# Patient Record
Sex: FEMALE | Race: WHITE
[De-identification: names, ages, dates, MRNs, and addresses within clinical notes are randomized per-mention and may not be internally consistent; named-entity substitution may affect disease eponyms.]

---

## 2017-12-25 ENCOUNTER — HOSPITAL ENCOUNTER (EMERGENCY)
Dept: HOSPITAL 41 - JD.ED | Age: 82
Discharge: HOME | End: 2017-12-25
Payer: MEDICARE

## 2017-12-25 VITALS — DIASTOLIC BLOOD PRESSURE: 81 MMHG | SYSTOLIC BLOOD PRESSURE: 129 MMHG

## 2017-12-25 DIAGNOSIS — Z79.82: ICD-10-CM

## 2017-12-25 DIAGNOSIS — Z79.01: ICD-10-CM

## 2017-12-25 DIAGNOSIS — Z88.2: ICD-10-CM

## 2017-12-25 DIAGNOSIS — Z87.891: ICD-10-CM

## 2017-12-25 DIAGNOSIS — I48.91: Primary | ICD-10-CM

## 2017-12-25 DIAGNOSIS — Z88.5: ICD-10-CM

## 2017-12-25 DIAGNOSIS — Z79.899: ICD-10-CM

## 2017-12-25 PROCEDURE — 36415 COLL VENOUS BLD VENIPUNCTURE: CPT

## 2017-12-25 PROCEDURE — 96365 THER/PROPH/DIAG IV INF INIT: CPT

## 2017-12-25 PROCEDURE — 85025 COMPLETE CBC W/AUTO DIFF WBC: CPT

## 2017-12-25 PROCEDURE — 93005 ELECTROCARDIOGRAM TRACING: CPT

## 2017-12-25 PROCEDURE — 96366 THER/PROPH/DIAG IV INF ADDON: CPT

## 2017-12-25 PROCEDURE — 80053 COMPREHEN METABOLIC PANEL: CPT

## 2017-12-25 PROCEDURE — 84443 ASSAY THYROID STIM HORMONE: CPT

## 2017-12-25 PROCEDURE — 84484 ASSAY OF TROPONIN QUANT: CPT

## 2017-12-25 PROCEDURE — 99285 EMERGENCY DEPT VISIT HI MDM: CPT

## 2017-12-25 PROCEDURE — 71010: CPT

## 2017-12-25 PROCEDURE — 96376 TX/PRO/DX INJ SAME DRUG ADON: CPT

## 2017-12-26 NOTE — CR
Chest: Portable view of the chest was obtained.

 

Comparison: Prior chest CT of 05/23/16 is available and chest x-ray of

 10/31/15 is available

 

Calcified breast prosthesis are identified bilaterally which are 

stable.  Heart size and mediastinum are within normal limits for 

portable technique.  Pulmonary vessels are slightly congested.  Lungs 

otherwise are clear.  Bony structures are grossly intact.  Previous 

sternotomy is noted.  Surgical clips are seen from prior 

cholecystectomy.

 

Impression:

1.  Possible mild pulmonary vascular congestion.

2.  Other incidental findings.

 

Diagnostic code #3

## 2018-01-04 ENCOUNTER — HOSPITAL ENCOUNTER (INPATIENT)
Dept: HOSPITAL 41 - JD.ED | Age: 83
LOS: 5 days | Discharge: NURSING, REHAB OR LONG-TERM CARE | DRG: 183 | End: 2018-01-09
Attending: INTERNAL MEDICINE | Admitting: INTERNAL MEDICINE
Payer: MEDICARE
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
## 2018-01-04 DIAGNOSIS — I44.0: ICD-10-CM

## 2018-01-04 DIAGNOSIS — S22.42XA: Primary | ICD-10-CM

## 2018-01-04 DIAGNOSIS — R10.9: ICD-10-CM

## 2018-01-04 DIAGNOSIS — I25.10: ICD-10-CM
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
## 2018-01-04 DIAGNOSIS — H35.30: ICD-10-CM

## 2018-01-04 DIAGNOSIS — M19.90: ICD-10-CM

## 2018-01-04 DIAGNOSIS — Z88.5: ICD-10-CM

## 2018-01-04 DIAGNOSIS — R07.81: ICD-10-CM

## 2018-01-04 DIAGNOSIS — I10: ICD-10-CM

## 2018-01-04 DIAGNOSIS — R53.1: ICD-10-CM

## 2018-01-04 DIAGNOSIS — I48.91: ICD-10-CM

## 2018-01-04 DIAGNOSIS — E87.5: ICD-10-CM

## 2018-01-04 DIAGNOSIS — S27.2XXA: ICD-10-CM

## 2018-01-04 DIAGNOSIS — Z90.49: ICD-10-CM

## 2018-01-04 DIAGNOSIS — Z88.6: ICD-10-CM

## 2018-01-04 DIAGNOSIS — Z79.899: ICD-10-CM

## 2018-01-04 DIAGNOSIS — M79.7: ICD-10-CM

## 2018-01-04 DIAGNOSIS — Z87.891: ICD-10-CM

## 2018-01-04 DIAGNOSIS — Z90.710: ICD-10-CM

## 2018-01-04 DIAGNOSIS — M54.9: ICD-10-CM

## 2018-01-04 DIAGNOSIS — R79.1: ICD-10-CM

## 2018-01-04 DIAGNOSIS — F41.9: ICD-10-CM

## 2018-01-04 DIAGNOSIS — R32: ICD-10-CM

## 2018-01-04 DIAGNOSIS — Z95.5: ICD-10-CM

## 2018-01-04 DIAGNOSIS — G89.11: ICD-10-CM

## 2018-01-04 DIAGNOSIS — I25.2: ICD-10-CM

## 2018-01-04 DIAGNOSIS — E03.9: ICD-10-CM

## 2018-01-04 DIAGNOSIS — I71.4: ICD-10-CM

## 2018-01-04 DIAGNOSIS — Z79.02: ICD-10-CM

## 2018-01-04 DIAGNOSIS — Z79.01: ICD-10-CM

## 2018-01-04 DIAGNOSIS — I95.9: ICD-10-CM

## 2018-01-04 DIAGNOSIS — Z88.2: ICD-10-CM

## 2018-01-04 PROCEDURE — 84484 ASSAY OF TROPONIN QUANT: CPT

## 2018-01-04 PROCEDURE — 96376 TX/PRO/DX INJ SAME DRUG ADON: CPT

## 2018-01-04 PROCEDURE — 85730 THROMBOPLASTIN TIME PARTIAL: CPT

## 2018-01-04 PROCEDURE — 81001 URINALYSIS AUTO W/SCOPE: CPT

## 2018-01-04 PROCEDURE — 71045 X-RAY EXAM CHEST 1 VIEW: CPT

## 2018-01-04 PROCEDURE — 74177 CT ABD & PELVIS W/CONTRAST: CPT

## 2018-01-04 PROCEDURE — 96361 HYDRATE IV INFUSION ADD-ON: CPT

## 2018-01-04 PROCEDURE — 80053 COMPREHEN METABOLIC PANEL: CPT

## 2018-01-04 PROCEDURE — G0378 HOSPITAL OBSERVATION PER HR: HCPCS

## 2018-01-04 PROCEDURE — 85610 PROTHROMBIN TIME: CPT

## 2018-01-04 PROCEDURE — 99285 EMERGENCY DEPT VISIT HI MDM: CPT

## 2018-01-04 PROCEDURE — 71260 CT THORAX DX C+: CPT

## 2018-01-04 PROCEDURE — 97530 THERAPEUTIC ACTIVITIES: CPT

## 2018-01-04 PROCEDURE — 36415 COLL VENOUS BLD VENIPUNCTURE: CPT

## 2018-01-04 PROCEDURE — 82248 BILIRUBIN DIRECT: CPT

## 2018-01-04 PROCEDURE — 97166 OT EVAL MOD COMPLEX 45 MIN: CPT

## 2018-01-04 PROCEDURE — 94667 MNPJ CHEST WALL 1ST: CPT

## 2018-01-04 PROCEDURE — 85025 COMPLETE CBC W/AUTO DIFF WBC: CPT

## 2018-01-04 PROCEDURE — 96375 TX/PRO/DX INJ NEW DRUG ADDON: CPT

## 2018-01-04 PROCEDURE — C1729 CATH, DRAINAGE: HCPCS

## 2018-01-04 PROCEDURE — 96374 THER/PROPH/DIAG INJ IV PUSH: CPT

## 2018-01-04 RX ADMIN — HYDROCODONE BITARTRATE AND ACETAMINOPHEN PRN TAB: 5; 325 TABLET ORAL at 21:25

## 2018-01-04 RX ADMIN — Medication PRN ML: at 10:59

## 2018-01-04 RX ADMIN — SODIUM CHLORIDE PRN MG: 9 INJECTION, SOLUTION INTRAVENOUS at 18:38

## 2018-01-04 RX ADMIN — KETOROLAC TROMETHAMINE SCH MG: 15 INJECTION, SOLUTION INTRAMUSCULAR; INTRAVENOUS at 18:13

## 2018-01-04 RX ADMIN — Medication PRN ML: at 11:23

## 2018-01-04 RX ADMIN — HYDROCODONE BITARTRATE AND ACETAMINOPHEN PRN TAB: 5; 325 TABLET ORAL at 17:17

## 2018-01-04 RX ADMIN — Medication SCH TAB: at 21:25

## 2018-01-04 NOTE — EDM.PDOC
ED HPI GENERAL MEDICAL PROBLEM





- General


Chief Complaint: Back Pain or Injury


Stated Complaint: FALL-BACK PAIN


Time Seen by Provider: 01/04/18 10:10





- History of Present Illness


INITIAL COMMENTS - FREE TEXT/NARRATIVE: 





82-year-old female presents emergency room with left-sided back and rib pain.





Shortly before arrival the patient fell striking a piece of furniture with her 

left back. She's developed significant pain aggravated by breathing and change 

of position she's developed significant left-sided abdominal pain. The patient 

currently is on Plavix and Coumadin she recently had a couple cardiac stents 

placed and has a history of atrial fibrillation. Patient has not developed any 

nausea or vomiting no fevers or chills no cough.


  ** Middle Back


Pain Score (Numeric/FACES): 10





- Related Data


 Allergies











Allergy/AdvReac Type Severity Reaction Status Date / Time


 


codeine Allergy  Disorientat Verified 01/04/18 09:50





   ion  


 


Sulfa (Sulfonamide Allergy  Cannot Verified 01/04/18 09:50





Antibiotics)   Remember  











Home Meds: 


 Home Meds





Alpha Lipoic Acid [Alpha-Lipoic Acid] 200 mg PO DAILY 06/08/15 [History]


Amitriptyline [Elavil] 25 mg PO BEDTIME 06/08/15 [History]


Cinnamon Bark [Cinnamon] 500 mg PO DAILY 06/08/15 [History]


Fish Oil/Omega-3 Fatty Acids [Fish Oil] 1,200 mg PO DAILY 06/08/15 [History]


Metoprolol Succinate [Toprol XL] 100 mg PO DAILY 06/08/15 [History]


Ubidecarenone [Co Q-10] 100 mg PO DAILY 06/08/15 [History]


Clopidogrel [Plavix] 75 mg PO DAILY 12/25/17 [History]


Levothyroxine [Synthroid] 88 mcg PO DAILY 12/25/17 [History]


Magnesium 250 mg PO DAILY 12/25/17 [History]


Multivit-Min/FA/Lycopene/Lut [Centrum Silver Tablet] 1 tab PO DAILY 12/25/17 [

History]


GrowBLOX  1 tab PO DAILY 12/25/17 [History]


Vit A/Vit C/Vit E/Zinc/Copper [Icaps Areds Formula] 2 tab PO BID 12/25/17 [

History]


Warfarin [Coumadin] 5 mg PO SUTUTHSA 12/25/17 [History]


Warfarin [Coumadin] 7.5 mg PO MOWEFR 12/25/17 [History]


atorvaSTATin [Lipitor] 10 mg PO DAILY 12/25/17 [History]











Past Medical History


HEENT History: Reports: Cataract, Macular Degeneration


Cardiovascular History: Reports: Stents


Other Cardiovascular History: resolved after surgery


Other Gastrointestinal History: during surgery intestine punctured


Genitourinary History: Reports: Urinary Incontinence


OB/GYN History: Reports: Pregnancy


Other OB/BYN History: x3


Musculoskeletal History: Reports: Arthritis, Fibromyalgia


Psychiatric History: Reports: Anxiety


Endocrine/Metabolic History: Reports: Hypothyroidism





- Past Surgical History


HEENT Surgical History: Reports: Cataract Surgery


Other HEENT Surgeries/Procedures: bi-lat


Other Cardiovascular Surgeries/Procedures: aortic anuerysm


GI Surgical History: Reports: Cholecystectomy


Female  Surgical History: Reports: Hysterectomy





Social & Family History





- Tobacco Use


Smoking Status *Q: Never Smoker


Years of Tobacco use: 15


Used Tobacco, but Quit: Yes


Month Tobacco Last Used: 15 yrs ago


Second Hand Smoke Exposure: No





- Caffeine Use


Caffeine Use: Reports: None





- Alcohol Use


Days Per Week of Alcohol Use: 0





- Recreational Drug Use


Recreational Drug Use: No





Review of Systems





- Review of Systems


Review Of Systems: See Below


Constitutional: Reports: No Symptoms


Eyes: Reports: No Symptoms


Ears: Reports: No Symptoms


Nose: Reports: No Symptoms


Mouth/Throat: Reports: No Symptoms


Respiratory: Reports: Pleuritic Chest Pain, Cough (Occasional).  Denies: 

Wheezing, Sputum


Cardiovascular: Reports: Chest Pain, Irregular Heart Rate.  Denies: Edema


GI/Abdominal: Reports: Abdominal Pain.  Denies: Bloody Stool, Constipation, 

Decreased Appetite, Diarrhea, Hematemesis, Nausea, Vomiting


Genitourinary: Reports: No Symptoms


Musculoskeletal: Reports: Back Pain


Skin: Reports: No Symptoms


Neurological: Reports: No Symptoms


Psychiatric: Reports: No Symptoms





ED EXAM, GENERAL





- Physical Exam


Exam: See Below


Exam Limited By: No Limitations


General Appearance: Alert, Mild Distress (From the discomfort usually triggered 

by change of position)


Head: Atraumatic, Normocephalic


Neck: Normal Inspection, Supple, Non-Tender, Full Range of Motion.  No: 

Lymphadenopathy (L), Lymphadenopathy (R)


Respiratory/Chest: No Respiratory Distress, Lungs Clear, Normal Breath Sounds


Cardiovascular: Regular Rate, Rhythm, No Edema, No Murmur, Other (On telemetry 

she has some sinus irregularity but is in a sinus driven rhythm)


GI/Abdominal: Other (Bowel sounds slightly diminished she has pretty 

significant left-sided abdominal discomfort no bruising or signs of external 

trauma. No apparent rebound or guarding)


Back Exam: Normal Inspection.  No: CVA Tenderness (L), CVA Tenderness (R), 

Vertebral Tenderness


Psychiatric: Normal Affect, Normal Mood


Skin Exam: Warm, Dry, Intact


Lymphatic: No Adenopathy





EKG INTERPRETATION


EKG Date: 01/04/18


Rhythm: Other (Sinus driven with sinus irregularity)


Axis: Normal


P-Wave: Absent (First-degree AV block)


QRS: Normal


ST-T: Other (nonspecific nondiagnostic changes)


QT: Normal


Comparison: NA - No Prior EKG (Other than rhythm abnormalities prior atrial 

fibrillation no significant changes noted)


EKG Interpretation Comments: 





Abnormal first-degree AV block sinus driven dysrhythmia





Course





- Vital Signs


Last Recorded V/S: 


 Last Vital Signs











Temp  36.3 C   01/04/18 09:42


 


Pulse  63   01/04/18 09:42


 


Resp  16   01/04/18 09:42


 


BP  142/86 H  01/04/18 09:42


 


Pulse Ox  96   01/04/18 09:42














- Orders/Labs/Meds


Orders: 


 Active Orders 24 hr











 Category Date Time Status


 


 URINALYSIS W/MICROSCOPIC [UA W/MICROSCOPIC] [URIN] Stat Lab  01/04/18 11:44 

Uncollected


 


 Sodium Chloride 0.9% [Saline Flush] Med  01/04/18 10:43 Active





 10 ml FLUSH ONETIME PRN   








 Medication Orders





Sodium Chloride (Saline Flush)  10 ml FLUSH ONETIME PRN


   PRN Reason: IV FLUSH


   Last Admin: 01/04/18 11:23  Dose: 10 ml


   Admin: 01/04/18 10:59  Dose: 10 ml








Labs: 


 Laboratory Tests











  01/04/18 01/04/18 01/04/18 Range/Units





  09:00 09:50 09:50 


 


WBC   8.30   (3.98-10.04)  K/mm3


 


RBC   4.92   (3.98-5.22)  M/mm3


 


Hgb   14.9   (11.2-15.7)  gm/L


 


Hct   44.6   (34.1-44.9)  %


 


MCV   90.7   (79.4-94.8)  fl


 


MCH   30.3   (25.6-32.2)  pg


 


MCHC   33.4   (32.2-35.5)  g/dl


 


RDW Std Deviation   44.2   (36.4-46.3)  fL


 


Plt Count   239   (182-369)  K/mm3


 


MPV   9.7   (9.4-12.3)  fl


 


Neutrophils % (Manual)   50   (40-60)  %


 


Band Neutrophils %   0   (0-10)  %


 


Lymphocytes % (Manual)   41 H   (20-40)  %


 


Atypical Lymphs %   0   %


 


Monocytes % (Manual)   7   (2-10)  %


 


Eosinophils % (Manual)   1   (0.7-5.8)  %


 


Basophils % (Manual)   1   (0.1-1.2)  


 


Platelet Estimate   Adequate   


 


RBC Morph Comment   Normal   


 


PT    20.6 H  (8.0-13.0)  SECONDS


 


INR    1.82  


 


APTT    34  (22-36)  SECONDS


 


Sodium     (136-145)  mEq/L


 


Potassium     (3.5-5.1)  mEq/L


 


Chloride     ()  mEq/L


 


Carbon Dioxide     (21-32)  mEq/L


 


Anion Gap     (5-15)  


 


BUN     (7-18)  mg/dL


 


Creatinine     (0.55-1.02)  mg/dL


 


Est Cr Clr Drug Dosing     mL/min


 


Estimated GFR (MDRD)     (>60)  mL/min


 


BUN/Creatinine Ratio     (14-18)  


 


Glucose     ()  mg/dL


 


Calcium     (8.5-10.1)  mg/dL


 


Total Bilirubin     (0.2-1.0)  mg/dL


 


Direct Bilirubin  0.30 H    (0.0-0.2)  mg/dl


 


AST     (15-37)  U/L


 


ALT     (14-59)  U/L


 


Alkaline Phosphatase     ()  U/L


 


Troponin I     (0.00-0.056)  ng/mL


 


Total Protein     (6.4-8.2)  g/dl


 


Albumin     (3.4-5.0)  g/dl


 


Globulin     gm/dL


 


Albumin/Globulin Ratio     (1-2)  














  01/04/18 Range/Units





  09:50 


 


WBC   (3.98-10.04)  K/mm3


 


RBC   (3.98-5.22)  M/mm3


 


Hgb   (11.2-15.7)  gm/L


 


Hct   (34.1-44.9)  %


 


MCV   (79.4-94.8)  fl


 


MCH   (25.6-32.2)  pg


 


MCHC   (32.2-35.5)  g/dl


 


RDW Std Deviation   (36.4-46.3)  fL


 


Plt Count   (182-369)  K/mm3


 


MPV   (9.4-12.3)  fl


 


Neutrophils % (Manual)   (40-60)  %


 


Band Neutrophils %   (0-10)  %


 


Lymphocytes % (Manual)   (20-40)  %


 


Atypical Lymphs %   %


 


Monocytes % (Manual)   (2-10)  %


 


Eosinophils % (Manual)   (0.7-5.8)  %


 


Basophils % (Manual)   (0.1-1.2)  


 


Platelet Estimate   


 


RBC Morph Comment   


 


PT   (8.0-13.0)  SECONDS


 


INR   


 


APTT   (22-36)  SECONDS


 


Sodium  144  (136-145)  mEq/L


 


Potassium  4.0  (3.5-5.1)  mEq/L


 


Chloride  109 H  ()  mEq/L


 


Carbon Dioxide  27  (21-32)  mEq/L


 


Anion Gap  12.0  (5-15)  


 


BUN  20 H  (7-18)  mg/dL


 


Creatinine  1.0  (0.55-1.02)  mg/dL


 


Est Cr Clr Drug Dosing  40.60  mL/min


 


Estimated GFR (MDRD)  53  (>60)  mL/min


 


BUN/Creatinine Ratio  20.0 H  (14-18)  


 


Glucose  98  ()  mg/dL


 


Calcium  8.9  (8.5-10.1)  mg/dL


 


Total Bilirubin  2.1 H  (0.2-1.0)  mg/dL


 


Direct Bilirubin   (0.0-0.2)  mg/dl


 


AST  31  (15-37)  U/L


 


ALT  29  (14-59)  U/L


 


Alkaline Phosphatase  74  ()  U/L


 


Troponin I  < 0.017  (0.00-0.056)  ng/mL


 


Total Protein  7.4  (6.4-8.2)  g/dl


 


Albumin  3.7  (3.4-5.0)  g/dl


 


Globulin  3.7  gm/dL


 


Albumin/Globulin Ratio  1.0  (1-2)  











Meds: 


Medications











Generic Name Dose Route Start Last Admin





  Trade Name Freq  PRN Reason Stop Dose Admin


 


Sodium Chloride  10 ml  01/04/18 10:43  01/04/18 11:23





  Saline Flush  FLUSH   10 ml





  ONETIME PRN   Administration





  IV FLUSH   














Discontinued Medications














Generic Name Dose Route Start Last Admin





  Trade Name Freq  PRN Reason Stop Dose Admin


 


Hydromorphone HCl  1 mg  01/04/18 11:06  01/04/18 11:10





  Dilaudid  IVPUSH  01/04/18 11:07  1 mg





  ONETIME ONE   Administration


 


Hydromorphone HCl  Confirm  01/04/18 11:12  01/04/18 11:16





  Dilaudid  Administered  01/04/18 11:13  Not Given





  Dose   





  1 mg   





  .ROUTE   





  .STK-MED ONE   


 


Sodium Chloride  500 mls @ 250 mls/hr  01/04/18 10:36  01/04/18 10:42





  Normal Saline  IV  01/04/18 12:35  250 mls/hr





  .BOLUS ONE   Administration


 


Sodium Chloride  Confirm  01/04/18 10:44  01/04/18 10:43





  Normal Saline  Administered  01/04/18 10:45  Not Given





  Dose   





  1,000 mls @ as directed   





  .ROUTE   





  .STK-MED ONE   


 


Iopamidol  100 ml  01/04/18 10:43  01/04/18 11:23





  Isovue-300 (61%)  IVPUSH  01/04/18 10:44  100 ml





  ONETIME ONE   Administration


 


Morphine Sulfate  2 mg  01/04/18 10:35  01/04/18 10:40





  Morphine  IVPUSH  01/04/18 10:36  2 mg





  ONETIME ONE   Administration














- Re-Assessments/Exams


Free Text/Narrative Re-Assessment/Exam: 





01/04/18 12:38


Laboratory evaluation is nondiagnostic of concern is mildly elevated bilirubin 

at 2.1 she has no associated bandemia INR is slightly subtherapeutic. Because 

the patient's symptoms x-ray examination was deferred the patient was sent to 

CT which is concerning for rib fractures on the left 7 through 10 and possibly 

to the patient does not have tenderness over the upper chest. At her age and 

with at least 4 rib fractures the patient should probably be watched closely to 

be certain she is maintaining her O2 saturation and has adequate pain control. 

She will need supervision with incentive spirometry and deep breathing 

exercises.


01/04/18 12:43


We believe we will have a bed available for the patient in another hour or so. 

Case discussed with Dr. Palacios or hospitalist.





Departure





- Departure


Time of Disposition: 12:44


Disposition: Refer to Observation


Clinical Impression: 


 Multiple fractures of ribs, left side, initial encounter for closed fracture








- Discharge Information


Referrals: 


Ryne Arvizu MD [Primary Care Provider] - 


Forms:  ED Department Discharge





- My Orders


Last 24 Hours: 


My Active Orders





01/04/18 10:43


Sodium Chloride 0.9% [Saline Flush]   10 ml FLUSH ONETIME PRN 





01/04/18 11:44


URINALYSIS W/MICROSCOPIC [UA W/MICROSCOPIC] [URIN] Stat 














- Assessment/Plan


Last 24 Hours: 


My Active Orders





01/04/18 10:43


Sodium Chloride 0.9% [Saline Flush]   10 ml FLUSH ONETIME PRN 





01/04/18 11:44


URINALYSIS W/MICROSCOPIC [UA W/MICROSCOPIC] [URIN] Stat

## 2018-01-04 NOTE — CT
CT chest

 

Technique: Multiple axial sections through the chest were obtained.  

Intravenous contrast was utilized.

 

Comparison: Prior noncontrast CT chest exam of 5/23/16.

 

Findings: Stable calcified breast prosthesis are noted.  Diffuse 

atherosclerotic calcification is noted within the aorta.  Aorta is 

slightly ectatic but stable from prior CT exam.  Mediastinum and hilar

 regions are unremarkable.  Coronary artery calcification is seen.  No

 pericardial thickening is seen.

 

Lungs show nothing acute.  Parenchymal fibrosis is noted within the 

left lung base which is more prominent than on prior study.  Minimal 

pleural thickening is seen within the left lung base which is likely 

chronic.

 

Scattered degenerative endplate spurring is noted within the spine.  

 

Findings suspicious for nondisplaced fracture within the left 2nd rib.

  Fracture also noted within the lateral left 7th rib as well as 

mildly displaced fracture within the lateral left 8th, 9th ribs as 

well as 10th ribs.  No right-sided rib fractures are seen.  No 

pneumothorax is identified.

 

Impression:

1.  Multiple left-sided rib fractures.

2.  Other incidental findings with no other acute abnormality being 

seen.

 

Diagnostic code #3

 

 

 

CT abdomen and pelvis

 

Technique: Multiple axial sections were obtained from above the dome 

of the diaphragm inferiorly through the pubic symphysis.  Intravenous 

contrast was utilized.  No oral contrast has been given.

 

Comparison: Prior noncontrast CT abdomen and pelvis study of 11/5/15 

performed as a ureteral stone protocol.  Contrast-enhanced CT abdomen 

and pelvis of 11/1/15 is also available.

 

Findings: Mild intrahepatic and extrahepatic biliary duct dilatation 

is seen which is stable from prior exam.  This is felt to be residual 

from prior cholecystectomy.  No focal abnormality is otherwise seen 

within the liver.  Small hiatal hernia is seen.  Small calcification 

is seen within the dome of the liver most likely pleural in location. 

 Spleen appears within normal limits.  Adrenal glands show no nodule. 

 Atrophy is seen of the inferior pole of the right kidney.  Several 

cysts are noted within the left kidney with largest measuring 1.2 cm. 

 Aortoiliac vessels show diffuse atherosclerotic calcification.  

Pancreas shows no focal abnormality.  No retroperitoneal adenopathy or

 mesenteric abnormalities are seen.  Appendix not visualized with 

certainty.  No pelvic mass or adenopathy is seen.  No free fluid or 

inflammatory change is seen.

 

Bone window settings show scattered degenerative changes throughout 

the spine.  No pelvic fracture is seen.

 

Impression:

1.  Incidental findings.  Nothing acute is seen on CT study of the 

abdomen and pelvis.

 

Diagnostic code #2

## 2018-01-05 RX ADMIN — KETOROLAC TROMETHAMINE SCH MG: 15 INJECTION, SOLUTION INTRAMUSCULAR; INTRAVENOUS at 17:36

## 2018-01-05 RX ADMIN — Medication PRN ML: at 10:03

## 2018-01-05 RX ADMIN — Medication SCH EACH: at 09:51

## 2018-01-05 RX ADMIN — KETOROLAC TROMETHAMINE SCH MG: 15 INJECTION, SOLUTION INTRAMUSCULAR; INTRAVENOUS at 09:54

## 2018-01-05 RX ADMIN — KETOROLAC TROMETHAMINE SCH MG: 15 INJECTION, SOLUTION INTRAMUSCULAR; INTRAVENOUS at 03:22

## 2018-01-05 RX ADMIN — HYDROCODONE BITARTRATE AND ACETAMINOPHEN PRN TAB: 5; 325 TABLET ORAL at 06:30

## 2018-01-05 RX ADMIN — Medication SCH TAB: at 09:44

## 2018-01-05 RX ADMIN — HYDROCODONE BITARTRATE AND ACETAMINOPHEN PRN TAB: 5; 325 TABLET ORAL at 03:23

## 2018-01-05 RX ADMIN — Medication SCH TAB: at 21:47

## 2018-01-05 RX ADMIN — HYDROCODONE BITARTRATE AND ACETAMINOPHEN PRN TAB: 5; 325 TABLET ORAL at 21:45

## 2018-01-05 NOTE — CONS
CONSULTING PHYSICIAN:  Percy Haynes MD

DATE OF CONSULTATION:  01/04/2018

 

HISTORY OF PRESENT ILLNESS:

Zuri Bartholomew is an 82-year-old female who came into the emergency room around

10 o'clock after having fallen backwards onto her left posterior back.  She was

short of breath and in pain and a CT scan was then performed showing fractures

of ribs, nondisplaced fracture of the left 2nd rib, 7th rib; mildly displaced

fractures, 8th, 9th and 10th rib.  There is no pneumothorax or hemothorax,

although the patient is on Coumadin with a normal INR and on Plavix.  The

patient has pain from the ribs.  She has had rib fractures in the past.  She has

comorbidities of atrial fibrillation and last year had heart stents placed and

is on Plavix.  She has also had about 10 years ago surgery on her, which lead to

perforation of the intestine, which was treated successfully.  She has some

urinary incontinence, arthralgias, hypothyroidism, anxiety, macular

degeneration, coronary artery disease with stents, and hypothyroidism treated.

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

history of cholecystectomy and hysterectomy.  She has never smoked.  Does not

drink.

 

REVIEW OF SYSTEMS:

Does have chest pain.  Does have a little shortness of breath, but no chest pain

or cardiac pain.  She has no nausea, vomiting, indigestion, or rectal bleeding.

No urinary problems other than some incontinence.

 

FAMILY HISTORY:

Not known.

 

MEDICATIONS:

Per medication reconciliation form.

 

ALLERGIES:

She has allergies to sulfa and codeine.

 

PHYSICAL EXAMINATION:

VITAL SIGNS:  Blood pressure of 142/86, respirations about 16 to 20, pulse at

99.

HEENT:  Eyes:  Sclerae white.  Extraocular muscle motion normal.  Oral Cavity:

Healthy mucous membrane.

NECK:  Supple.  No nodes.  No thyromegaly.

LUNGS:  Clear on both sides.  There is bruising on the left posterior with pain

on palpation.

ABDOMEN:  Soft.

EXTREMITIES:  Upper and lower extremities:  No angulation deformities.

NEUROLOGIC:  Normal 3 through 12 intact.  No sensorineural deficit.

 

ASSESSMENT:

Multiple medical problems, on anticoagulation; rib fractures, some mild

displacement 4 ribs, maybe 5 on the left.

 

PLAN:

Pain medication and incentive spirometer and flutter valve and cough and deep

breathing.

 

DD:  01/04/2018 20:06:40

DT:  01/04/2018 21:44:47  MMODAL

Job #:  [id number]

## 2018-01-05 NOTE — CR
Chest: Portable view of the chest was obtained.

 

Comparison: Prior chest x-ray of 12/25/17.

 

Calcified breast prosthesis are incidentally noted.  Prior sternotomy 

is noted.  Rib fractures seen on prior chest CT of 01/04/18 are poorly

 seen on plain film exam.  Heart size and mediastinum are within 

normal limits.  Lungs are clear.  Pulmonary vessels are slightly 

increased which are stable.  Bony structures are osteopenic.  Surgical

 clips are seen within the upper right abdomen.

 

Impression:

1.  Stable findings as noted above.  Nothing acute is seen.

 

Diagnostic code #2

## 2018-01-05 NOTE — PCM.PN
- General Info


Date of Service: 01/05/18


Admission Dx/Problem (Free Text): 


 Admission Diagnosis/Problem





Admission Diagnosis/Problem      Rib injury








Subjective Update: 


In to see Zuri. She is sitting on the side of the bed. She has no 

complaints at this time. 





Functional Status: Reports: Pain Controlled, Tolerating Diet, Ambulating, 

Urinating, Incentive Spirometry.  Denies: New Symptoms





- Review of Systems


General: Reports: Weakness.  Denies: Fever, Fatigue, Malaise, Chills


HEENT: Reports: No Symptoms.  Denies: Headaches, Sinus Congestion, Sore Throat


Pulmonary: Reports: Pleuritic Chest Pain.  Denies: Shortness of Breath, Cough, 

Sputum


Cardiovascular: Reports: No Symptoms.  Denies: Chest Pain, Palpitations, 

Dyspnea on Exertion, Orthopnea, Edema


Gastrointestinal: Reports: No Symptoms.  Denies: Abdominal Pain, Constipation, 

Diarrhea, Nausea, Vomiting


Genitourinary: Reports: No Symptoms


Musculoskeletal: Reports: Back Pain, Other (left - rib pain)


Skin: Reports: No Symptoms


Neurological: Reports: No Symptoms


Psychiatric: Reports: No Symptoms





- Patient Data


Vitals - Most Recent: 


 Last Vital Signs











Temp  97.9 F   01/05/18 12:05


 


Pulse  55 L  01/05/18 12:05


 


Resp  18   01/05/18 12:05


 


BP  124/50 L  01/05/18 12:05


 


Pulse Ox  95   01/05/18 12:05











Weight - Most Recent: 186 lb 3.2 oz


Med Orders - Current: 


 Current Medications





Hydrocodone Bitart/Acetaminophen (Norco 325-5 Mg)  1 tab PO Q4H PRN


   PRN Reason: Pain


   Last Admin: 01/05/18 06:30 Dose:  1 tab


Amitriptyline HCl (Elavil)  25 mg PO BEDTIME [facility]


   Last Admin: 01/04/18 21:25 Dose:  25 mg


Clopidogrel Bisulfate (Plavix)  75 mg PO DAILY [facility]


   Last Admin: 01/05/18 09:44 Dose:  75 mg


Docusate Sodium (Colace)  100 mg PO BID [facility]


Hydromorphone HCl (Dilaudid)  1 mg IVPUSH Q2H PRN


   PRN Reason: Pain


Ketorolac Tromethamine (Toradol)  15 mg IVPUSH Q8H [facility]


   Stop: 01/06/18 10:01


   Last Admin: 01/05/18 09:54 Dose:  15 mg


Levothyroxine Sodium (Synthroid)  88 mcg PO ACBREAKFAST [facility]


   Last Admin: 01/05/18 06:30 Dose:  88 mcg


Metoprolol Succinate (Toprol Xl)  100 mg PO DAILY [facility]


   Last Admin: 01/05/18 09:49 Dose:  100 mg


Miscellaneous Information (Remove Patch)  1 ea TRDERM Q72H [facility]


Ondansetron HCl (Zofran)  4 mg IVPUSH Q8H PRN


   PRN Reason: Nausea/Vomiting


   Last Admin: 01/04/18 18:38 Dose:  4 mg


Ptom: Ubidecarenone (100mg (Coenzyme-Q))  1 each PO DAILY [facility]


   Last Admin: 01/05/18 09:51 Dose:  1 each


Polyethylene Glycol (Miralax)  17 gm PO BEDTIME PRN


   PRN Reason: Constipation


Simvastatin (Zocor)  10 mg PO BEDTIME [facility]


Sodium Chloride (Saline Flush)  10 ml FLUSH ONETIME PRN


   PRN Reason: IV FLUSH


   Last Admin: 01/05/18 10:03 Dose:  10 ml


Vit A/Vit C/Vit E/Selen/Cu/Zn/Lutei (Icaps Mv)  2 tab PO BID [facility]


   Last Admin: 01/05/18 09:44 Dose:  2 tab


Warfarin Sodium (Coumadin)  7.5 mg PO MoWeFr@1800 [facility]


Warfarin Sodium (Coumadin)  5 mg PO SuTuThSa@1800 [facility]


   Last Admin: 01/04/18 18:39 Dose:  5 mg





Discontinued Medications





Enoxaparin Sodium (Lovenox)  40 mg SUBCUT DAILY [facility]


   Last Admin: 01/05/18 10:01 Dose:  Not Given


Fentanyl (Duragesic)  12 mcg TRDERM Q72H [facility]


   Last Admin: 01/04/18 18:47 Dose:  12 mcg


Hydromorphone HCl (Dilaudid)  1 mg IVPUSH ONETIME ONE


   Stop: 01/04/18 11:07


   Last Admin: 01/04/18 11:10 Dose:  1 mg


Hydromorphone HCl (Dilaudid) Confirm Administered Dose 1 mg .ROUTE .STK-MED ONE


   Stop: 01/04/18 11:13


   Last Admin: 01/04/18 11:16 Dose:  Not Given


Hydromorphone HCl (Dilaudid)  1 mg IVPUSH Q6H PRN


   PRN Reason: Pain (severe 7-10)


   Last Admin: 01/04/18 18:19 Dose:  1 mg


Sodium Chloride (Normal Saline)  500 mls @ 250 mls/hr IV .BOLUS ONE


   Stop: 01/04/18 12:35


   Last Admin: 01/04/18 10:42 Dose:  250 mls/hr


Sodium Chloride (Normal Saline) Confirm Administered Dose 1,000 mls @ as 

directed .ROUTE .STK-MED ONE


   Stop: 01/04/18 10:45


   Last Admin: 01/04/18 10:43 Dose:  Not Given


Lactated Ringer's (Ringers, Lactated)  1,000 mls @ 75 mls/hr IV ASDIRECTED [facility]


   Stop: 01/05/18 06:00


   Last Admin: 01/04/18 20:00 Dose:  75 mls/hr


Iopamidol (Isovue-300 (61%))  100 ml IVPUSH ONETIME ONE


   Stop: 01/04/18 10:44


   Last Admin: 01/04/18 11:23 Dose:  100 ml


Morphine Sulfate (Morphine)  2 mg IVPUSH ONETIME ONE


   Stop: 01/04/18 10:36


   Last Admin: 01/04/18 10:40 Dose:  2 mg


Morphine Sulfate (Morphine)  2 mg IVPUSH ONETIME ONE


   Stop: 01/04/18 16:49


   Last Admin: 01/04/18 18:05 Dose:  Not Given


Scopolamine (Scopolamine)  1 each TRDERM Q72H PRN


   PRN Reason: Nausea











- Exam


Quality Assessment: Supplemental Oxygen (2L), DVT Prophylaxis


General: Alert, Oriented, Cooperative, No Acute Distress


HEENT: Pupils Equal, Pupils Reactive, EOMI, Mucous Membr. Moist/Pink


Neck: Supple, Trachea Midline, No JVD, No Thyromegaly


Lungs: Clear to Auscultation, Normal Respiratory Effort, Decreased Breath 

Sounds.  No: Rhonchi, Rub, Stridor, Wheezing


Cardiovascular: Regular Rate, Regular Rhythm, No Murmurs


GI/Abdominal Exam: Normal Bowel Sounds, Soft, Non-Tender, No Organomegaly, No 

Distention, No Abnormal Bruit, No Mass, Pelvis Stable


 (Female) Exam: Deferred


Back Exam: Normal Inspection, Decreased Range of Motion (due to pain ), Other (

left sided bruising )


Extremities: Normal Inspection, Normal Range of Motion, Non-Tender, No Pedal 

Edema, Normal Capillary Refill, Other (scattered bruising to right arm )


Peripheral Pulses: 2+: Radial (L), Radial (R), Posterior Tibial (L), Posterior 

Tibial (R), Dorsalis Pedis (L), Dorsalis Pedis (R)


Skin: Warm, Dry, Intact


Neurological: No New Focal Deficit


Psy/Mental Status: Alert, Normal Affect, Normal Mood





- Problem List & Annotations


(1) Multiple fractures of ribs, left side, initial encounter for closed fracture


SNOMED Code(s): 64123320


   Code(s): S22.42XA - MULTIPLE FRACTURES OF RIBS, LEFT SIDE, INIT FOR CLOS FX 

  Status: Acute   Priority: High   Current Visit: Yes   





(2) Abdominal pain


SNOMED Code(s): 88110490


   Code(s): R10.9 - UNSPECIFIED ABDOMINAL PAIN   Status: Resolved   Priority: 

Low   Current Visit: No   


Qualifiers: 


   Abdominal location: epigastric   Qualified Code(s): R10.13 - Epigastric pain

   





(3) Atrial fibrillation with RVR


SNOMED Code(s): 504999524861020


   Code(s): I48.91 - UNSPECIFIED ATRIAL FIBRILLATION   Status: Chronic   

Priority: Low   Current Visit: No   





(4) Chest pain


SNOMED Code(s): 38983507


   Code(s): R07.9 - CHEST PAIN, UNSPECIFIED   Status: Acute   Priority: Medium 

  Current Visit: No   


Qualifiers: 


   Chest pain type: unspecified   Qualified Code(s): R07.9 - Chest pain, 

unspecified   





(5) Hypertension


SNOMED Code(s): 41542184


   Code(s): I10 - ESSENTIAL (PRIMARY) HYPERTENSION   Status: Chronic   Priority

: Medium   Current Visit: No   


Qualifiers: 


   Hypertension type: essential hypertension   Qualified Code(s): I10 - 

Essential (primary) hypertension   





- Problem List Review


Problem List Initiated/Reviewed/Updated: Yes





- Plan


Plan:: 


Impression:





S/P fall with multiple rib fractures


Recent increase in Metoprolol, dizziness when taken on an empty stomach - Will 

switch to metoprolol tartrate BID 


A Fib on coumadin with out overt signs of hematoma








Chronic


CAD/PCI on Plavix


HTN


Macular Degeneration


Fibromyalgia


Hypothyroidism


Aortic Aneursym








Plan:





Gen Surg consult re: rib fx, follow as needed for PTX


Pain mgt narcotic/non-narcotics


PT/OT consults


CSM consult


Home meds


Daily meds


DVT/GI prophylaxis


Adjustment of medical regimen to avoid hypotension


Admit to obs with telemetry - upgraded to inpatient 


O2 as needed

## 2018-01-06 RX ADMIN — SODIUM CHLORIDE PRN MG: 9 INJECTION, SOLUTION INTRAVENOUS at 07:14

## 2018-01-06 RX ADMIN — HYDROCODONE BITARTRATE AND ACETAMINOPHEN PRN TAB: 5; 325 TABLET ORAL at 03:16

## 2018-01-06 RX ADMIN — Medication SCH TAB: at 09:09

## 2018-01-06 RX ADMIN — HYDROCODONE BITARTRATE AND ACETAMINOPHEN PRN TAB: 5; 325 TABLET ORAL at 21:24

## 2018-01-06 RX ADMIN — Medication SCH TAB: at 21:22

## 2018-01-06 RX ADMIN — HYDROCODONE BITARTRATE AND ACETAMINOPHEN PRN TAB: 5; 325 TABLET ORAL at 12:00

## 2018-01-06 RX ADMIN — Medication SCH EACH: at 14:47

## 2018-01-06 RX ADMIN — KETOROLAC TROMETHAMINE SCH MG: 15 INJECTION, SOLUTION INTRAMUSCULAR; INTRAVENOUS at 10:17

## 2018-01-06 RX ADMIN — HYDROCODONE BITARTRATE AND ACETAMINOPHEN PRN TAB: 5; 325 TABLET ORAL at 17:31

## 2018-01-06 RX ADMIN — HYDROCODONE BITARTRATE AND ACETAMINOPHEN PRN TAB: 5; 325 TABLET ORAL at 07:08

## 2018-01-06 RX ADMIN — KETOROLAC TROMETHAMINE SCH MG: 15 INJECTION, SOLUTION INTRAMUSCULAR; INTRAVENOUS at 03:02

## 2018-01-06 NOTE — PCM.PN
- General Info


Date of Service: 01/06/18


Functional Status: Reports: Pain Controlled, Tolerating Diet, Ambulating, 

Urinating





- Review of Systems


General: Reports: Weakness


HEENT: Reports: No Symptoms


Pulmonary: Reports: No Symptoms


Cardiovascular: Reports: Chest Pain


Gastrointestinal: Reports: No Symptoms


Genitourinary: Reports: No Symptoms


Musculoskeletal: Reports: No Symptoms


Skin: Reports: No Symptoms


Neurological: Reports: No Symptoms


Psychiatric: Reports: No Symptoms





- Patient Data


Vitals - Most Recent: 


 Last Vital Signs











Temp  36.4 C   01/06/18 03:08


 


Pulse  56 L  01/06/18 09:47


 


Resp  13   01/06/18 03:08


 


BP  110/50 L  01/06/18 09:47


 


Pulse Ox  94 L  01/06/18 03:08











Weight - Most Recent: 86.409 kg


I&O - Last 24 Hours: 


 Intake & Output











 01/05/18 01/06/18 01/06/18





 22:59 06:59 14:59


 


Intake Total 410 600 180


 


Output Total 300 1000 


 


Balance 110 -400 180











Lab Results Last 24 Hours: 


 Laboratory Results - last 24 hr











  01/06/18 01/06/18 01/06/18 Range/Units





  05:45 05:45 05:45 


 


WBC   8.15   (3.98-10.04)  K/mm3


 


RBC   4.23   (3.98-5.22)  M/mm3


 


Hgb   13.1   (11.2-15.7)  gm/L


 


Hct   39.0   (34.1-44.9)  %


 


MCV   92.2   (79.4-94.8)  fl


 


MCH   31.0   (25.6-32.2)  pg


 


MCHC   33.6   (32.2-35.5)  g/dl


 


RDW Std Deviation   45.3   (36.4-46.3)  fL


 


Plt Count   195   (182-369)  K/mm3


 


MPV   9.6   (9.4-12.3)  fl


 


Neut % (Auto)   57.2   (34.0-71.1)  %


 


Lymph % (Auto)   27.6   (19.3-51.7)  %


 


Mono % (Auto)   12.5   (4.7-12.5)  %


 


Eos % (Auto)   2.1   (0.7-5.8)  


 


Baso % (Auto)   0.4   (0.1-1.2)  %


 


Neut # (Auto)   4.66   (1.56-6.13)  K/mm3


 


Lymph # (Auto)   2.25   (1.18-3.74)  K/mm3


 


Mono # (Auto)   1.02 H   (0.24-0.36)  K/mm3


 


Eos # (Auto)   0.17   (0.04-0.36)  K/mm3


 


Baso # (Auto)   0.03   (0.01-0.08)  K/mm3


 


PT  25.2 H    (8.0-13.0)  SECONDS


 


INR  2.20    


 


Sodium    142  (136-145)  mEq/L


 


Potassium    4.5  (3.5-5.1)  mEq/L


 


Chloride    107  ()  mEq/L


 


Carbon Dioxide    28  (21-32)  mEq/L


 


Anion Gap    11.5  (5-15)  


 


BUN    20 H  (7-18)  mg/dL


 


Creatinine    0.9  (0.55-1.02)  mg/dL


 


Est Cr Clr Drug Dosing    45.12  mL/min


 


Estimated GFR (MDRD)    60  (>60)  mL/min


 


BUN/Creatinine Ratio    22.2 H  (14-18)  


 


Glucose    85  ()  mg/dL


 


Calcium    8.5  (8.5-10.1)  mg/dL


 


Magnesium    1.8  (1.8-2.4)  mg/dl











Med Orders - Current: 


 Current Medications





Hydrocodone Bitart/Acetaminophen (Norco 325-5 Mg)  1 tab PO Q4H PRN


   PRN Reason: Pain


   Last Admin: 01/06/18 12:00 Dose:  1 tab


Amitriptyline HCl (Elavil)  25 mg PO BEDTIME [facility]


   Last Admin: 01/05/18 21:47 Dose:  25 mg


Clopidogrel Bisulfate (Plavix)  75 mg PO DAILY [facility]


   Last Admin: 01/06/18 09:09 Dose:  75 mg


Docusate Sodium (Colace)  100 mg PO BID [facility]


   Last Admin: 01/06/18 09:09 Dose:  100 mg


Hydromorphone HCl (Dilaudid)  1 mg IVPUSH Q2H PRN


   PRN Reason: Pain


   Last Admin: 01/06/18 07:14 Dose:  1 mg


Levothyroxine Sodium (Synthroid)  88 mcg PO ACBREAKFAST [facility]


   Last Admin: 01/06/18 06:19 Dose:  88 mcg


Magnesium Oxide (Magnesium Oxide)  400 mg PO DAILY [facility]


Metoprolol Tartrate (Lopressor)  50 mg PO BID@1200,2100 [facility]


Miscellaneous Information (Remove Patch)  1 ea TRDERM Q72H [facility]


Ondansetron HCl (Zofran)  4 mg IVPUSH Q8H PRN


   PRN Reason: Nausea/Vomiting


   Last Admin: 01/06/18 07:14 Dose:  4 mg


Ptom: Ubidecarenone (100mg (Coenzyme-Q))  1 each PO DAILY [facility]


   Last Admin: 01/05/18 09:51 Dose:  1 each


Polyethylene Glycol (Miralax)  17 gm PO BEDTIME PRN


   PRN Reason: Constipation


Simvastatin (Zocor)  10 mg PO BEDTIME [facility]


   Last Admin: 01/05/18 21:48 Dose:  10 mg


Sodium Chloride (Saline Flush)  10 ml FLUSH ONETIME PRN


   PRN Reason: IV FLUSH


   Last Admin: 01/05/18 10:03 Dose:  10 ml


Vit A/Vit C/Vit E/Selen/Cu/Zn/Lutei (Icaps Mv)  2 tab PO BID [facility]


   Last Admin: 01/06/18 09:09 Dose:  2 tab


Warfarin Sodium (Coumadin)  7.5 mg PO MoWeFr@1800 [facility]


   Last Admin: 01/05/18 17:37 Dose:  7.5 mg


Warfarin Sodium (Coumadin)  5 mg PO SuTuThSa@1800 [facility]


   Last Admin: 01/04/18 18:39 Dose:  5 mg





Discontinued Medications





Enoxaparin Sodium (Lovenox)  40 mg SUBCUT DAILY [facility]


   Last Admin: 01/05/18 10:01 Dose:  Not Given


Fentanyl (Duragesic)  12 mcg TRDERM Q72H [facility]


   Last Admin: 01/04/18 18:47 Dose:  12 mcg


Hydromorphone HCl (Dilaudid)  1 mg IVPUSH ONETIME ONE


   Stop: 01/04/18 11:07


   Last Admin: 01/04/18 11:10 Dose:  1 mg


Hydromorphone HCl (Dilaudid) Confirm Administered Dose 1 mg .ROUTE .STK-MED ONE


   Stop: 01/04/18 11:13


   Last Admin: 01/04/18 11:16 Dose:  Not Given


Hydromorphone HCl (Dilaudid)  1 mg IVPUSH Q6H PRN


   PRN Reason: Pain (severe 7-10)


   Last Admin: 01/04/18 18:19 Dose:  1 mg


Sodium Chloride (Normal Saline)  500 mls @ 250 mls/hr IV .BOLUS ONE


   Stop: 01/04/18 12:35


   Last Admin: 01/04/18 10:42 Dose:  250 mls/hr


Sodium Chloride (Normal Saline) Confirm Administered Dose 1,000 mls @ as 

directed .ROUTE .STK-MED ONE


   Stop: 01/04/18 10:45


   Last Admin: 01/04/18 10:43 Dose:  Not Given


Lactated Ringer's (Ringers, Lactated)  1,000 mls @ 75 mls/hr IV ASDIRECTED [facility]


   Stop: 01/05/18 06:00


   Last Admin: 01/04/18 20:00 Dose:  75 mls/hr


Iopamidol (Isovue-300 (61%))  100 ml IVPUSH ONETIME ONE


   Stop: 01/04/18 10:44


   Last Admin: 01/04/18 11:23 Dose:  100 ml


Ketorolac Tromethamine (Toradol)  15 mg IVPUSH Q8H [facility]


   Stop: 01/06/18 10:01


   Last Admin: 01/06/18 10:17 Dose:  15 mg


Metoprolol Succinate (Toprol Xl)  100 mg PO DAILY [facility]


   Last Admin: 01/05/18 09:49 Dose:  100 mg


Metoprolol Tartrate (Lopressor)  50 mg PO Q12HR [facility]


   Last Admin: 01/06/18 09:47 Dose:  Not Given


Morphine Sulfate (Morphine)  2 mg IVPUSH ONETIME ONE


   Stop: 01/04/18 10:36


   Last Admin: 01/04/18 10:40 Dose:  2 mg


Morphine Sulfate (Morphine)  2 mg IVPUSH ONETIME ONE


   Stop: 01/04/18 16:49


   Last Admin: 01/04/18 18:05 Dose:  Not Given


Scopolamine (Scopolamine)  1 each TRDERM Q72H PRN


   PRN Reason: Nausea











- Exam


Quality Assessment: DVT Prophylaxis


General: Alert, Oriented, Cooperative, No Acute Distress


HEENT: Pupils Equal, Pupils Reactive, EOMI


Neck: Trachea Midline, No JVD


Lungs: Normal Respiratory Effort


Cardiovascular: Regular Rate, Bradycardia


GI/Abdominal Exam: Normal Bowel Sounds, Soft, Non-Tender, No Organomegaly, No 

Distention


 (Female) Exam: Deferred


Back Exam: Normal Inspection


Extremities: Normal Inspection


Skin: Warm


Neurological: No New Focal Deficit, Normal Gait, Normal Speech


Psy/Mental Status: Alert, Normal Affect, Normal Mood





- Problem List & Annotations


(1) Multiple fractures of ribs, left side, initial encounter for closed fracture


SNOMED Code(s): 75214506


   Code(s): S22.42XA - MULTIPLE FRACTURES OF RIBS, LEFT SIDE, INIT FOR CLOS FX 

  Status: Acute   Priority: High   Current Visit: Yes   





(2) Abdominal pain


SNOMED Code(s): 04767071


   Code(s): R10.9 - UNSPECIFIED ABDOMINAL PAIN   Status: Resolved   Priority: 

Low   Current Visit: No   


Qualifiers: 


   Abdominal location: epigastric   Qualified Code(s): R10.13 - Epigastric pain

   





(3) Atrial fibrillation with RVR


SNOMED Code(s): 814772545149114


   Code(s): I48.91 - UNSPECIFIED ATRIAL FIBRILLATION   Status: Chronic   

Priority: Low   Current Visit: No   





(4) Chest pain


SNOMED Code(s): 45810038


   Code(s): R07.9 - CHEST PAIN, UNSPECIFIED   Status: Acute   Priority: Medium 

  Current Visit: No   


Qualifiers: 


   Chest pain type: unspecified   Qualified Code(s): R07.9 - Chest pain, 

unspecified   





(5) Hypertension


SNOMED Code(s): 81605616


   Code(s): I10 - ESSENTIAL (PRIMARY) HYPERTENSION   Status: Chronic   Priority

: Medium   Current Visit: No   


Qualifiers: 


   Hypertension type: essential hypertension   Qualified Code(s): I10 - 

Essential (primary) hypertension   





- Problem List Review


Problem List Initiated/Reviewed/Updated: Yes





- My Orders


Last 24 Hours: 


My Active Orders





01/06/18 12:15


Magnesium Oxide   400 mg PO DAILY 














- Plan


Plan:: 


Impression:





S/P fall with multiple rib fractures


Recent increase in Metoprolol, dizziness when taken on an empty stomach - Will 

switch to metoprolol tartrate BID 


A Fib on coumadin without overt signs of hematoma








Chronic


CAD/PCI on Plavix


HTN


Macular Degeneration


Fibromyalgia


Hypothyroidism


Aortic Aneursym








Plan:





Gen Surg consult re: rib fx, follow as needed for PTX


Pain mgt narcotic/non-narcotics


PT/OT consults


CSM consult


Home meds


Daily meds


DVT/GI prophylaxis


Adjustment of medical regimen to avoid hypotension


Change Lopressor with parameters


O2 as needed 


Considering SNF at [place]

## 2018-01-07 RX ADMIN — KETOROLAC TROMETHAMINE SCH MG: 15 INJECTION, SOLUTION INTRAMUSCULAR; INTRAVENOUS at 16:57

## 2018-01-07 RX ADMIN — Medication SCH TAB: at 08:25

## 2018-01-07 RX ADMIN — KETOROLAC TROMETHAMINE SCH MG: 15 INJECTION, SOLUTION INTRAMUSCULAR; INTRAVENOUS at 20:28

## 2018-01-07 RX ADMIN — HYDROCODONE BITARTRATE AND ACETAMINOPHEN PRN TAB: 5; 325 TABLET ORAL at 21:45

## 2018-01-07 RX ADMIN — KETOROLAC TROMETHAMINE SCH: 15 INJECTION, SOLUTION INTRAMUSCULAR; INTRAVENOUS at 21:18

## 2018-01-07 RX ADMIN — Medication SCH: at 09:17

## 2018-01-07 RX ADMIN — HYDROCODONE BITARTRATE AND ACETAMINOPHEN PRN TAB: 5; 325 TABLET ORAL at 02:18

## 2018-01-07 RX ADMIN — HYDROCODONE BITARTRATE AND ACETAMINOPHEN PRN TAB: 5; 325 TABLET ORAL at 05:58

## 2018-01-07 RX ADMIN — Medication SCH TAB: at 20:30

## 2018-01-07 RX ADMIN — SODIUM CHLORIDE PRN MG: 9 INJECTION, SOLUTION INTRAVENOUS at 13:13

## 2018-01-07 NOTE — PCM.PN
- General Info


Date of Service: 01/07/18


Subjective Update: 











Experiencing pain after Toradol was stopped, clarified with MAR after rounds.


Functional Status: Reports: Tolerating Diet





- Review of Systems


General: Reports: Weakness


HEENT: Reports: No Symptoms


Pulmonary: Reports: Shortness of Breath, Pleuritic Chest Pain


Cardiovascular: Reports: No Symptoms


Gastrointestinal: Reports: No Symptoms


Genitourinary: Reports: No Symptoms


Musculoskeletal: Reports: No Symptoms


Skin: Reports: No Symptoms


Neurological: Reports: No Symptoms


Psychiatric: Reports: No Symptoms





- Patient Data


Vitals - Most Recent: 


 Last Vital Signs











Temp  36.5 C   01/07/18 09:03


 


Pulse  80   01/07/18 09:33


 


Resp  14   01/07/18 09:03


 


BP  123/58 L  01/07/18 09:33


 


Pulse Ox  94 L  01/07/18 09:03











Weight - Most Recent: 86.046 kg


I&O - Last 24 Hours: 


 Intake & Output











 01/06/18 01/07/18 01/07/18





 22:59 06:59 14:59


 


Intake Total 960 700 60


 


Output Total 950 750 


 


Balance 10 -50 60











Lab Results Last 24 Hours: 


 Laboratory Results - last 24 hr











  01/07/18 01/07/18 01/07/18 Range/Units





  06:40 06:40 09:20 


 


WBC  8.37    (3.98-10.04)  K/mm3


 


RBC  4.26    (3.98-5.22)  M/mm3


 


Hgb  13.1    (11.2-15.7)  gm/L


 


Hct  39.2    (34.1-44.9)  %


 


MCV  92.0    (79.4-94.8)  fl


 


MCH  30.8    (25.6-32.2)  pg


 


MCHC  33.4    (32.2-35.5)  g/dl


 


RDW Std Deviation  44.2    (36.4-46.3)  fL


 


Plt Count  201    (182-369)  K/mm3


 


MPV  9.4    (9.4-12.3)  fl


 


Neut % (Auto)  63.0    (34.0-71.1)  %


 


Lymph % (Auto)  22.8    (19.3-51.7)  %


 


Mono % (Auto)  11.8    (4.7-12.5)  %


 


Eos % (Auto)  1.8    (0.7-5.8)  


 


Baso % (Auto)  0.4    (0.1-1.2)  %


 


Neut # (Auto)  5.27    (1.56-6.13)  K/mm3


 


Lymph # (Auto)  1.91    (1.18-3.74)  K/mm3


 


Mono # (Auto)  0.99 H    (0.24-0.36)  K/mm3


 


Eos # (Auto)  0.15    (0.04-0.36)  K/mm3


 


Baso # (Auto)  0.03    (0.01-0.08)  K/mm3


 


Sodium   141   (136-145)  mEq/L


 


Potassium   4.8   (3.5-5.1)  mEq/L


 


Chloride   104   ()  mEq/L


 


Carbon Dioxide   30   (21-32)  mEq/L


 


Anion Gap   11.8   (5-15)  


 


BUN   21 H   (7-18)  mg/dL


 


Creatinine   1.0   (0.55-1.02)  mg/dL


 


Est Cr Clr Drug Dosing   40.60   mL/min


 


Estimated GFR (MDRD)   53   (>60)  mL/min


 


BUN/Creatinine Ratio   21.0 H   (14-18)  


 


Glucose   105   ()  mg/dL


 


Calcium   8.7   (8.5-10.1)  mg/dL


 


Magnesium   1.8   (1.8-2.4)  mg/dl


 


Total Bilirubin     (0.2-1.0)  mg/dL


 


Troponin I    < 0.017  (0.00-0.056)  ng/mL


 


Lipase     ()  U/L














  01/07/18 Range/Units





  09:20 


 


WBC   (3.98-10.04)  K/mm3


 


RBC   (3.98-5.22)  M/mm3


 


Hgb   (11.2-15.7)  gm/L


 


Hct   (34.1-44.9)  %


 


MCV   (79.4-94.8)  fl


 


MCH   (25.6-32.2)  pg


 


MCHC   (32.2-35.5)  g/dl


 


RDW Std Deviation   (36.4-46.3)  fL


 


Plt Count   (182-369)  K/mm3


 


MPV   (9.4-12.3)  fl


 


Neut % (Auto)   (34.0-71.1)  %


 


Lymph % (Auto)   (19.3-51.7)  %


 


Mono % (Auto)   (4.7-12.5)  %


 


Eos % (Auto)   (0.7-5.8)  


 


Baso % (Auto)   (0.1-1.2)  %


 


Neut # (Auto)   (1.56-6.13)  K/mm3


 


Lymph # (Auto)   (1.18-3.74)  K/mm3


 


Mono # (Auto)   (0.24-0.36)  K/mm3


 


Eos # (Auto)   (0.04-0.36)  K/mm3


 


Baso # (Auto)   (0.01-0.08)  K/mm3


 


Sodium   (136-145)  mEq/L


 


Potassium   (3.5-5.1)  mEq/L


 


Chloride   ()  mEq/L


 


Carbon Dioxide   (21-32)  mEq/L


 


Anion Gap   (5-15)  


 


BUN   (7-18)  mg/dL


 


Creatinine   (0.55-1.02)  mg/dL


 


Est Cr Clr Drug Dosing   mL/min


 


Estimated GFR (MDRD)   (>60)  mL/min


 


BUN/Creatinine Ratio   (14-18)  


 


Glucose   ()  mg/dL


 


Calcium   (8.5-10.1)  mg/dL


 


Magnesium   (1.8-2.4)  mg/dl


 


Total Bilirubin  1.4 H  (0.2-1.0)  mg/dL


 


Troponin I   (0.00-0.056)  ng/mL


 


Lipase  601 H  ()  U/L











Med Orders - Current: 


 Current Medications





Hydrocodone Bitart/Acetaminophen (Norco 325-5 Mg)  1 tab PO Q4H PRN


   PRN Reason: Pain


   Last Admin: 01/07/18 05:58 Dose:  1 tab


Amitriptyline HCl (Elavil)  25 mg PO BEDTIME [facility]


   Last Admin: 01/06/18 21:23 Dose:  25 mg


Clopidogrel Bisulfate (Plavix)  75 mg PO DAILY [facility]


   Last Admin: 01/07/18 08:25 Dose:  75 mg


Docusate Sodium (Colace)  100 mg PO BID [facility]


   Last Admin: 01/07/18 08:25 Dose:  100 mg


Fentanyl (Duragesic)  12 mcg TRDERM Q72H [facility]


   Last Admin: 01/07/18 11:50 Dose:  12 mcg


Hydromorphone HCl (Dilaudid)  1 mg IVPUSH Q2H PRN


   PRN Reason: Pain


   Last Admin: 01/07/18 07:54 Dose:  1 mg


Ketorolac Tromethamine (Toradol)  15 mg IVPUSH Q6H [facility]


   Stop: 01/08/18 09:31


Levothyroxine Sodium (Synthroid)  88 mcg PO ACBREAKFAST [facility]


   Last Admin: 01/07/18 05:58 Dose:  88 mcg


Magnesium Oxide (Magnesium Oxide)  400 mg PO DAILY [facility]


   Last Admin: 01/07/18 08:25 Dose:  400 mg


Metoprolol Tartrate (Lopressor)  50 mg PO BID@1200,2100 [facility]


   Last Admin: 01/07/18 12:17 Dose:  Not Given


Miscellaneous Information (Remove Patch)  1 ea TRDERM Q72H [facility]


Ondansetron HCl (Zofran)  4 mg IVPUSH Q8H PRN


   PRN Reason: Nausea/Vomiting


   Last Admin: 01/07/18 13:13 Dose:  4 mg


Ptom: Ubidecarenone (100mg (Coenzyme-Q))  1 each PO DAILY [facility]


   Last Admin: 01/07/18 09:17 Dose:  Not Given


Polyethylene Glycol (Miralax)  17 gm PO BEDTIME PRN


   PRN Reason: Constipation


   Last Admin: 01/07/18 08:26 Dose:  17 gm


Simvastatin (Zocor)  10 mg PO BEDTIME [facility]


   Last Admin: 01/06/18 21:23 Dose:  10 mg


Sodium Chloride (Saline Flush)  10 ml FLUSH ONETIME PRN


   PRN Reason: IV FLUSH


   Last Admin: 01/05/18 10:03 Dose:  10 ml


Vit A/Vit C/Vit E/Selen/Cu/Zn/Lutei (Icaps Mv)  1 tab PO BID [facility]


   Last Admin: 01/07/18 08:25 Dose:  1 tab


Warfarin Sodium (Coumadin)  7.5 mg PO MoWeFr@1800 [facility]


   Last Admin: 01/05/18 17:37 Dose:  7.5 mg


Warfarin Sodium (Coumadin)  5 mg PO SuTuThSa@1800 [facility]


   Last Admin: 01/06/18 17:32 Dose:  5 mg





Discontinued Medications





Enoxaparin Sodium (Lovenox)  40 mg SUBCUT DAILY [facility]


   Last Admin: 01/05/18 10:01 Dose:  Not Given


Fentanyl (Duragesic)  12 mcg TRDERM Q72H [facility]


   Last Admin: 01/04/18 18:47 Dose:  12 mcg


Hydromorphone HCl (Dilaudid)  1 mg IVPUSH ONETIME ONE


   Stop: 01/04/18 11:07


   Last Admin: 01/04/18 11:10 Dose:  1 mg


Hydromorphone HCl (Dilaudid) Confirm Administered Dose 1 mg .ROUTE .STK-MED ONE


   Stop: 01/04/18 11:13


   Last Admin: 01/04/18 11:16 Dose:  Not Given


Hydromorphone HCl (Dilaudid)  1 mg IVPUSH Q6H PRN


   PRN Reason: Pain (severe 7-10)


   Last Admin: 01/04/18 18:19 Dose:  1 mg


Sodium Chloride (Normal Saline)  500 mls @ 250 mls/hr IV .BOLUS ONE


   Stop: 01/04/18 12:35


   Last Admin: 01/04/18 10:42 Dose:  250 mls/hr


Sodium Chloride (Normal Saline) Confirm Administered Dose 1,000 mls @ as 

directed .ROUTE .STK-MED ONE


   Stop: 01/04/18 10:45


   Last Admin: 01/04/18 10:43 Dose:  Not Given


Lactated Ringer's (Ringers, Lactated)  1,000 mls @ 75 mls/hr IV ASDIRECTED [facility]


   Stop: 01/05/18 06:00


   Last Admin: 01/04/18 20:00 Dose:  75 mls/hr


Iopamidol (Isovue-300 (61%))  100 ml IVPUSH ONETIME ONE


   Stop: 01/04/18 10:44


   Last Admin: 01/04/18 11:23 Dose:  100 ml


Ketorolac Tromethamine (Toradol)  15 mg IVPUSH Q8H [facility]


   Stop: 01/06/18 10:01


   Last Admin: 01/06/18 10:17 Dose:  15 mg


Ketorolac Tromethamine (Toradol)  30 mg IVPUSH ONETIME ONE


   Stop: 01/07/18 11:22


   Last Admin: 01/07/18 11:49 Dose:  30 mg


Ketorolac Tromethamine (Toradol)  15 mg IVPUSH Q6H [facility]


   Stop: 01/08/18 11:31


Metoprolol Succinate (Toprol Xl)  100 mg PO DAILY [facility]


   Last Admin: 01/05/18 09:49 Dose:  100 mg


Metoprolol Tartrate (Lopressor)  50 mg PO Q12HR [facility]


   Last Admin: 01/06/18 09:47 Dose:  Not Given


Metoprolol Tartrate (Lopressor)  50 mg PO ONETIME ONE


   Stop: 01/07/18 09:27


   Last Admin: 01/07/18 09:35 Dose:  Not Given


Miscellaneous Information (Remove Patch)  1 ea TRDERM Q72H [facility]


Morphine Sulfate (Morphine)  2 mg IVPUSH ONETIME ONE


   Stop: 01/04/18 10:36


   Last Admin: 01/04/18 10:40 Dose:  2 mg


Morphine Sulfate (Morphine)  2 mg IVPUSH ONETIME ONE


   Stop: 01/04/18 16:49


   Last Admin: 01/04/18 18:05 Dose:  Not Given


Scopolamine (Scopolamine)  1 each TRDERM Q72H PRN


   PRN Reason: Nausea


Vit A/Vit C/Vit E/Selen/Cu/Zn/Lutei (Icaps Mv)  2 tab PO BID [facility]


   Last Admin: 01/06/18 21:22 Dose:  1 tab











- Exam


Quality Assessment: Supplemental Oxygen, DVT Prophylaxis


General: Alert, Oriented, Cooperative, Mild Distress


HEENT: Pupils Equal, Pupils Reactive, EOMI


Neck: Trachea Midline, No JVD


Lungs: Normal Respiratory Effort


Cardiovascular: Regular Rate, Regular Rhythm


GI/Abdominal Exam: Normal Bowel Sounds, Soft, Non-Tender, No Organomegaly, No 

Distention


 (Female) Exam: Deferred


Back Exam: Normal Inspection


Extremities: Normal Inspection, Normal Range of Motion, Non-Tender, No Pedal 

Edema


Skin: Warm


Neurological: No New Focal Deficit


Psy/Mental Status: Alert, Anxious





- Problem List & Annotations


(1) Multiple fractures of ribs, left side, initial encounter for closed fracture


SNOMED Code(s): 95121556


   Code(s): S22.42XA - MULTIPLE FRACTURES OF RIBS, LEFT SIDE, INIT FOR CLOS FX 

  Status: Acute   Priority: High   Current Visit: Yes   





(2) Abdominal pain


SNOMED Code(s): 91356940


   Code(s): R10.9 - UNSPECIFIED ABDOMINAL PAIN   Status: Resolved   Priority: 

Low   Current Visit: No   


Qualifiers: 


   Abdominal location: epigastric   Qualified Code(s): R10.13 - Epigastric pain

   





(3) Atrial fibrillation with RVR


SNOMED Code(s): 822361252231031


   Code(s): I48.91 - UNSPECIFIED ATRIAL FIBRILLATION   Status: Chronic   

Priority: Low   Current Visit: No   





(4) Chest pain


SNOMED Code(s): 13954698


   Code(s): R07.9 - CHEST PAIN, UNSPECIFIED   Status: Acute   Priority: Medium 

  Current Visit: No   


Qualifiers: 


   Chest pain type: unspecified   Qualified Code(s): R07.9 - Chest pain, 

unspecified   





(5) Hypertension


SNOMED Code(s): 93727655


   Code(s): I10 - ESSENTIAL (PRIMARY) HYPERTENSION   Status: Chronic   Priority

: Medium   Current Visit: No   


Qualifiers: 


   Hypertension type: essential hypertension   Qualified Code(s): I10 - 

Essential (primary) hypertension   





- Problem List Review


Problem List Initiated/Reviewed/Updated: Yes





- My Orders


Last 24 Hours: 


My Active Orders





01/06/18 15:53


Up ad Marnie [RC] ASDIRECTED 





01/06/18 19:23


Oxygen Therapy [RC] ASDIRECTED 





01/07/18 08:52


EKG 12 Lead [EK] Routine 





01/07/18 08:59


EKG 12 Lead [EK] Stat 





01/07/18 09:00


Multivitamins/Min/FA/Lut/Zeax [ICaps MV]   1 tab PO BID 





01/07/18 11:30


fentaNYL [Duragesic]   12 mcg TRDERM Q72H 





01/07/18 15:00


TROPONIN I [CHEM] Routine 





01/07/18 15:30


Ketorolac [Toradol]   15 mg IVPUSH Q6H 





01/10/18 11:30


Remove Patch   1 ea TRDERM Q72H 














- Plan


Plan:: 


Impression:





S/P fall with multiple rib fractures


Recent increase in Metoprolol, dizziness when taken on an empty stomach - Will 

switch to metoprolol tartrate BID 


A Fib on coumadin without overt signs of hematoma








Chronic


CAD/PCI on Plavix


HTN


Macular Degeneration


Fibromyalgia


Hypothyroidism


Aortic Aneursym








Plan:





Gen Surg consult re: rib fx, follow as needed for PTX


Pain mgt narcotic/non-narcotics;  resume Toradol, change to Motrin in 24 hours;

  will try Fentanyl patch again.


PT/OT consults


CSM consult


Home meds


Daily meds


DVT/GI prophylaxis


Adjustment of medical regimen to avoid hypotension


Change Lopressor with parameters


O2 as needed 


Considering SNF [facility] [place].

## 2018-01-07 NOTE — PCM.CONSN
- General Info


Date of Service: 01/07/18





- Patient Data


Vitals - Most Recent: 


 Last Vital Signs











Temp  97.2 F   01/07/18 14:40


 


Pulse  85   01/07/18 20:30


 


Resp  16   01/07/18 16:23


 


BP  91/64   01/07/18 20:30


 


Pulse Ox  99   01/07/18 16:23











Weight - Most Recent: 86.046 kg


I&O - Last 24 Hours: 


 Intake & Output











 01/07/18 01/07/18 01/07/18





 07:59 15:59 23:59


 


Intake Total 341 06 9920


 


Output Total 750  600


 


Balance -50 60 440











Lab Results Last 24 Hours: 


 Laboratory Results - last 24 hr











  01/07/18 01/07/18 01/07/18 Range/Units





  06:40 06:40 09:20 


 


WBC  8.37    (3.98-10.04)  K/mm3


 


RBC  4.26    (3.98-5.22)  M/mm3


 


Hgb  13.1    (11.2-15.7)  gm/L


 


Hct  39.2    (34.1-44.9)  %


 


MCV  92.0    (79.4-94.8)  fl


 


MCH  30.8    (25.6-32.2)  pg


 


MCHC  33.4    (32.2-35.5)  g/dl


 


RDW Std Deviation  44.2    (36.4-46.3)  fL


 


Plt Count  201    (182-369)  K/mm3


 


MPV  9.4    (9.4-12.3)  fl


 


Neut % (Auto)  63.0    (34.0-71.1)  %


 


Lymph % (Auto)  22.8    (19.3-51.7)  %


 


Mono % (Auto)  11.8    (4.7-12.5)  %


 


Eos % (Auto)  1.8    (0.7-5.8)  


 


Baso % (Auto)  0.4    (0.1-1.2)  %


 


Neut # (Auto)  5.27    (1.56-6.13)  K/mm3


 


Lymph # (Auto)  1.91    (1.18-3.74)  K/mm3


 


Mono # (Auto)  0.99 H    (0.24-0.36)  K/mm3


 


Eos # (Auto)  0.15    (0.04-0.36)  K/mm3


 


Baso # (Auto)  0.03    (0.01-0.08)  K/mm3


 


Sodium   141   (136-145)  mEq/L


 


Potassium   4.8   (3.5-5.1)  mEq/L


 


Chloride   104   ()  mEq/L


 


Carbon Dioxide   30   (21-32)  mEq/L


 


Anion Gap   11.8   (5-15)  


 


BUN   21 H   (7-18)  mg/dL


 


Creatinine   1.0   (0.55-1.02)  mg/dL


 


Est Cr Clr Drug Dosing   40.60   mL/min


 


Estimated GFR (MDRD)   53   (>60)  mL/min


 


BUN/Creatinine Ratio   21.0 H   (14-18)  


 


Glucose   105   ()  mg/dL


 


Calcium   8.7   (8.5-10.1)  mg/dL


 


Magnesium   1.8   (1.8-2.4)  mg/dl


 


Total Bilirubin     (0.2-1.0)  mg/dL


 


Troponin I    < 0.017  (0.00-0.056)  ng/mL


 


Lipase     ()  U/L














  01/07/18 01/07/18 Range/Units





  09:20 15:00 


 


WBC    (3.98-10.04)  K/mm3


 


RBC    (3.98-5.22)  M/mm3


 


Hgb    (11.2-15.7)  gm/L


 


Hct    (34.1-44.9)  %


 


MCV    (79.4-94.8)  fl


 


MCH    (25.6-32.2)  pg


 


MCHC    (32.2-35.5)  g/dl


 


RDW Std Deviation    (36.4-46.3)  fL


 


Plt Count    (182-369)  K/mm3


 


MPV    (9.4-12.3)  fl


 


Neut % (Auto)    (34.0-71.1)  %


 


Lymph % (Auto)    (19.3-51.7)  %


 


Mono % (Auto)    (4.7-12.5)  %


 


Eos % (Auto)    (0.7-5.8)  


 


Baso % (Auto)    (0.1-1.2)  %


 


Neut # (Auto)    (1.56-6.13)  K/mm3


 


Lymph # (Auto)    (1.18-3.74)  K/mm3


 


Mono # (Auto)    (0.24-0.36)  K/mm3


 


Eos # (Auto)    (0.04-0.36)  K/mm3


 


Baso # (Auto)    (0.01-0.08)  K/mm3


 


Sodium    (136-145)  mEq/L


 


Potassium    (3.5-5.1)  mEq/L


 


Chloride    ()  mEq/L


 


Carbon Dioxide    (21-32)  mEq/L


 


Anion Gap    (5-15)  


 


BUN    (7-18)  mg/dL


 


Creatinine    (0.55-1.02)  mg/dL


 


Est Cr Clr Drug Dosing    mL/min


 


Estimated GFR (MDRD)    (>60)  mL/min


 


BUN/Creatinine Ratio    (14-18)  


 


Glucose    ()  mg/dL


 


Calcium    (8.5-10.1)  mg/dL


 


Magnesium    (1.8-2.4)  mg/dl


 


Total Bilirubin  1.4 H   (0.2-1.0)  mg/dL


 


Troponin I   < 0.017  (0.00-0.056)  ng/mL


 


Lipase  601 H   ()  U/L











Med Orders - Current: 


 Current Medications





Hydrocodone Bitart/Acetaminophen (Norco 325-5 Mg)  1 tab PO Q4H PRN


   PRN Reason: Pain


   Last Admin: 01/07/18 05:58 Dose:  1 tab


Amitriptyline HCl (Elavil)  25 mg PO BEDTIME [facility]


   Last Admin: 01/07/18 20:30 Dose:  25 mg


Clopidogrel Bisulfate (Plavix)  75 mg PO DAILY [facility]


   Last Admin: 01/07/18 08:25 Dose:  75 mg


Docusate Sodium (Colace)  100 mg PO BID [facility]


   Last Admin: 01/07/18 20:30 Dose:  100 mg


Hydromorphone HCl (Dilaudid)  0.5 mg IVPUSH Q4H PRN


   PRN Reason: Pain


Ketorolac Tromethamine (Toradol)  15 mg IVPUSH Q6H [facility]


   Stop: 01/08/18 09:31


   Last Admin: 01/07/18 21:18 Dose:  Not Given


Levothyroxine Sodium (Synthroid)  88 mcg PO ACBREAKFAST [facility]


   Last Admin: 01/07/18 05:58 Dose:  88 mcg


Magnesium Oxide (Magnesium Oxide)  400 mg PO BID [facility]


   Last Admin: 01/07/18 20:30 Dose:  400 mg


Metoprolol Tartrate (Lopressor)  50 mg PO BID@1200,2100 [facility]


   Last Admin: 01/07/18 20:30 Dose:  50 mg


Ondansetron HCl (Zofran)  4 mg IVPUSH Q8H PRN


   PRN Reason: Nausea/Vomiting


   Last Admin: 01/07/18 13:13 Dose:  4 mg


Ptom: Ubidecarenone (100mg (Coenzyme-Q))  1 each PO DAILY [facility]


   Last Admin: 01/07/18 09:17 Dose:  Not Given


Polyethylene Glycol (Miralax)  17 gm PO BEDTIME PRN


   PRN Reason: Constipation


   Last Admin: 01/07/18 08:26 Dose:  17 gm


Simvastatin (Zocor)  10 mg PO BEDTIME [facility]


   Last Admin: 01/07/18 20:30 Dose:  10 mg


Sodium Chloride (Saline Flush)  10 ml FLUSH ONETIME PRN


   PRN Reason: IV FLUSH


   Last Admin: 01/05/18 10:03 Dose:  10 ml


Vit A/Vit C/Vit E/Selen/Cu/Zn/Lutei (Icaps Mv)  1 tab PO BID [facility]


   Last Admin: 01/07/18 20:30 Dose:  1 tab


Warfarin Sodium (Coumadin)  7.5 mg PO MoWeFr@1800 [facility]


   Last Admin: 01/05/18 17:37 Dose:  7.5 mg


Warfarin Sodium (Coumadin)  5 mg PO SuTuThSa@1800 [facility]


   Last Admin: 01/07/18 17:00 Dose:  5 mg





Discontinued Medications





Bisacodyl (Dulcolax)  10 mg RECTAL ONETIME ONE


   Stop: 01/07/18 21:33


Enoxaparin Sodium (Lovenox)  40 mg SUBCUT DAILY [facility]


   Last Admin: 01/05/18 10:01 Dose:  Not Given


Fentanyl (Duragesic)  12 mcg TRDERM Q72H [facility]


   Last Admin: 01/04/18 18:47 Dose:  12 mcg


Fentanyl (Duragesic)  12 mcg TRDERM Q72H [facility]


   Last Admin: 01/07/18 11:50 Dose:  12 mcg


Hydromorphone HCl (Dilaudid)  1 mg IVPUSH ONETIME ONE


   Stop: 01/04/18 11:07


   Last Admin: 01/04/18 11:10 Dose:  1 mg


Hydromorphone HCl (Dilaudid) Confirm Administered Dose 1 mg .ROUTE .STK-MED ONE


   Stop: 01/04/18 11:13


   Last Admin: 01/04/18 11:16 Dose:  Not Given


Hydromorphone HCl (Dilaudid)  1 mg IVPUSH Q6H PRN


   PRN Reason: Pain (severe 7-10)


   Last Admin: 01/04/18 18:19 Dose:  1 mg


Hydromorphone HCl (Dilaudid)  1 mg IVPUSH Q2H PRN


   PRN Reason: Pain


   Last Admin: 01/07/18 07:54 Dose:  1 mg


Sodium Chloride (Normal Saline)  500 mls @ 250 mls/hr IV .BOLUS ONE


   Stop: 01/04/18 12:35


   Last Admin: 01/04/18 10:42 Dose:  250 mls/hr


Sodium Chloride (Normal Saline) Confirm Administered Dose 1,000 mls @ as 

directed .ROUTE .STK-MED ONE


   Stop: 01/04/18 10:45


   Last Admin: 01/04/18 10:43 Dose:  Not Given


Lactated Ringer's (Ringers, Lactated)  1,000 mls @ 75 mls/hr IV ASDIRECTED [facility]


   Stop: 01/05/18 06:00


   Last Admin: 01/04/18 20:00 Dose:  75 mls/hr


Sodium Chloride (Normal Saline)  250 mls @ 250 mls/hr IV .BOLUS ONE


   Stop: 01/07/18 20:49


   Last Admin: 01/07/18 20:06 Dose:  Not Given


Sodium Chloride (Normal Saline)  500 mls @ 500 mls/hr IV .BOLUS ONE


   Stop: 01/07/18 20:49


   Last Admin: 01/07/18 20:10 Dose:  Not Given


Sodium Chloride (Normal Saline)  250 mls @ 250 mls/hr IV .BOLUS ONE


   Stop: 01/07/18 20:56


   Last Admin: 01/07/18 20:28 Dose:  250 mls/hr


Iopamidol (Isovue-300 (61%))  100 ml IVPUSH ONETIME ONE


   Stop: 01/04/18 10:44


   Last Admin: 01/04/18 11:23 Dose:  100 ml


Ketorolac Tromethamine (Toradol)  15 mg IVPUSH Q8H [facility]


   Stop: 01/06/18 10:01


   Last Admin: 01/06/18 10:17 Dose:  15 mg


Ketorolac Tromethamine (Toradol)  30 mg IVPUSH ONETIME ONE


   Stop: 01/07/18 11:22


   Last Admin: 01/07/18 11:49 Dose:  30 mg


Ketorolac Tromethamine (Toradol)  15 mg IVPUSH Q6H [facility]


   Stop: 01/08/18 11:31


Magnesium Oxide (Magnesium Oxide)  400 mg PO DAILY [facility]


   Last Admin: 01/07/18 08:25 Dose:  400 mg


Methylprednisolone Sodium Succinate (Solu-Medrol)  125 mg IVPUSH ONETIME ONE


   Stop: 01/07/18 19:53


   Last Admin: 01/07/18 20:28 Dose:  125 mg


Metoprolol Succinate (Toprol Xl)  100 mg PO DAILY [facility]


   Last Admin: 01/05/18 09:49 Dose:  100 mg


Metoprolol Tartrate (Lopressor)  50 mg PO Q12HR [facility]


   Last Admin: 01/06/18 09:47 Dose:  Not Given


Metoprolol Tartrate (Lopressor)  50 mg PO ONETIME ONE


   Stop: 01/07/18 09:27


   Last Admin: 01/07/18 09:35 Dose:  Not Given


Miscellaneous Information (Remove Patch)  1 ea TRDERM Q72H [facility]


Miscellaneous Information (Remove Patch)  1 ea TRDERM Q72H [facility]


Miscellaneous Information (Remove Patch)  1 ea TRDERM NOW STA


   Stop: 01/07/18 19:54


   Last Admin: 01/07/18 20:41 Dose:  1 ea


Morphine Sulfate (Morphine)  2 mg IVPUSH ONETIME ONE


   Stop: 01/04/18 10:36


   Last Admin: 01/04/18 10:40 Dose:  2 mg


Morphine Sulfate (Morphine)  2 mg IVPUSH ONETIME ONE


   Stop: 01/04/18 16:49


   Last Admin: 01/04/18 18:05 Dose:  Not Given


Scopolamine (Scopolamine)  1 each TRDERM Q72H PRN


   PRN Reason: Nausea


Vit A/Vit C/Vit E/Selen/Cu/Zn/Lutei (Icaps Mv)  2 tab PO BID [facility]


   Last Admin: 01/06/18 21:22 Dose:  1 tab











Consult PN Assessment/Plan


Procedures: 


Procedures





ASSAY OF AMYLASE (10/31/15)


ASSAY OF LACTIC ACID (10/31/15)


ASSAY OF LIPASE (10/31/15)


ASSAY OF MAGNESIUM (10/31/15)


ASSAY OF PHOSPHORUS (10/31/15)


ASSAY OF TROPONIN QUANT (12/25/17)


ASSAY THYROID STIM HORMONE (12/25/17)


CHEST X-RAY 1 VIEW FRONTAL (12/25/17)


COMPLETE CBC W/AUTO DIFF WBC (12/25/17)


COMPREHEN METABOLIC PANEL (12/25/17)


CT ABD & PELV W/CONTRAST (10/31/15)


CT ABD & PELVIS W/O CONTRAST (11/05/15)


CT THORAX W/DYE (10/31/15)


CT THORAX W/O DYE (05/23/16)


DXA BONE DENSITY AXIAL (03/16/17)


ELECTROCARDIOGRAM TRACING (12/25/17)


EMERGENCY DEPT VISIT (12/25/17)


EMERGENCY DEPT VISIT (06/08/15)


FIBRIN DEGRADATION QUANT (11/05/15)


GLYCOSYLATED HEMOGLOBIN TEST (11/17/14)


HYDRATE IV INFUSION ADD-ON (10/31/15)


LIPID PANEL (04/20/15)


MICROBE SUSCEPTIBLE SUMAN (10/31/15)


ROUTINE VENIPUNCTURE (12/25/17)


RPR S/N/AX/GEN/TRNK 2.5CM/< (06/08/15)


THER/PROPH/DIAG IV INF ADDON (12/25/17)


THER/PROPH/DIAG IV INF INIT (12/25/17)


TISSUE EXAM BY PATHOLOGIST (11/04/15)


TX/PRO/DX INJ NEW DRUG ADDON (10/31/15)


TX/PRO/DX INJ SAME DRUG ADON (12/25/17)


URINALYSIS AUTO W/SCOPE (10/31/15)


URINE BACTERIA CULTURE (10/31/15)


URINE CULTURE/COLONY COUNT (10/31/15)








Problem List Initiated/Reviewed/Updated: Yes


My Orders Last 24 Hours: 


My Active Orders





01/07/18 21:31


Enema [RC] ASDIRECTED 





01/08/18 05:00


COMPREHENSIVE METABOLIC PN,CMP [CHEM] Routine 


LIPASE [CHEM] Routine 











Plan: 





surgical consult [place] abdominal pain dictated KAIT

## 2018-01-08 PROCEDURE — 0W9B30Z DRAINAGE OF LEFT PLEURAL CAVITY WITH DRAINAGE DEVICE, PERCUTANEOUS APPROACH: ICD-10-PCS | Performed by: SURGERY

## 2018-01-08 RX ADMIN — HYDROCODONE BITARTRATE AND ACETAMINOPHEN PRN TAB: 5; 325 TABLET ORAL at 10:32

## 2018-01-08 RX ADMIN — HYDROCODONE BITARTRATE AND ACETAMINOPHEN PRN TAB: 5; 325 TABLET ORAL at 16:26

## 2018-01-08 RX ADMIN — Medication SCH TAB: at 09:11

## 2018-01-08 RX ADMIN — HYDROCODONE BITARTRATE AND ACETAMINOPHEN PRN TAB: 5; 325 TABLET ORAL at 21:23

## 2018-01-08 RX ADMIN — KETOROLAC TROMETHAMINE SCH MG: 15 INJECTION, SOLUTION INTRAMUSCULAR; INTRAVENOUS at 02:29

## 2018-01-08 RX ADMIN — Medication SCH TAB: at 21:16

## 2018-01-08 RX ADMIN — HYDROCODONE BITARTRATE AND ACETAMINOPHEN PRN TAB: 5; 325 TABLET ORAL at 02:29

## 2018-01-08 RX ADMIN — HYDROCODONE BITARTRATE AND ACETAMINOPHEN PRN TAB: 5; 325 TABLET ORAL at 06:26

## 2018-01-08 RX ADMIN — KETOROLAC TROMETHAMINE SCH MG: 15 INJECTION, SOLUTION INTRAMUSCULAR; INTRAVENOUS at 09:11

## 2018-01-08 RX ADMIN — Medication SCH: at 09:17

## 2018-01-08 NOTE — PCM.CONSN
- General Info


Date of Service: 01/08/18


Functional Status: Reports: Pain Controlled





- Review of Systems


Pulmonary: Reports: Other (needing adtional O2 to keep saturation above 90)


Gastrointestinal: Reports: No Symptoms, Other (destention but is passing gas )





- Patient Data


Vitals - Most Recent: 


 Last Vital Signs











Temp  98.6 F   01/08/18 16:21


 


Pulse  73   01/08/18 17:22


 


Resp  17   01/08/18 16:21


 


BP  120/86   01/08/18 16:21


 


Pulse Ox  91 L  01/08/18 17:22











Weight - Most Recent: 87.09 kg


I&O - Last 24 Hours: 


 Intake & Output











 01/08/18 01/08/18 01/08/18





 07:59 15:59 23:59


 


Intake Total 450 360 


 


Output Total 600  


 


Balance -150 360 











Lab Results Last 24 Hours: 


 Laboratory Results - last 24 hr











  01/08/18 01/08/18 01/08/18 Range/Units





  06:05 06:05 06:05 


 


WBC  16.52 H    (3.98-10.04)  K/mm3


 


RBC  3.57 L    (3.98-5.22)  M/mm3


 


Hgb  11.1 L    (11.2-15.7)  gm/L


 


Hct  33.0 L    (34.1-44.9)  %


 


MCV  92.4    (79.4-94.8)  fl


 


MCH  31.1    (25.6-32.2)  pg


 


MCHC  33.6    (32.2-35.5)  g/dl


 


RDW Std Deviation  43.7    (36.4-46.3)  fL


 


Plt Count  257    (182-369)  K/mm3


 


MPV  9.8    (9.4-12.3)  fl


 


Neut % (Auto)  88.4 H    (34.0-71.1)  %


 


Lymph % (Auto)  8.5 L    (19.3-51.7)  %


 


Mono % (Auto)  2.6 L    (4.7-12.5)  %


 


Eos % (Auto)  0.1 L    (0.7-5.8)  


 


Baso % (Auto)  0.0 L    (0.1-1.2)  %


 


Neut # (Auto)  14.61 H    (1.56-6.13)  K/mm3


 


Lymph # (Auto)  1.41    (1.18-3.74)  K/mm3


 


Mono # (Auto)  0.43 H    (0.24-0.36)  K/mm3


 


Eos # (Auto)  0.01 L    (0.04-0.36)  K/mm3


 


Baso # (Auto)  0.00 L    (0.01-0.08)  K/mm3


 


Manual Slide Review  Abnormal smear    


 


PT   22.6 H   (8.0-13.0)  SECONDS


 


INR   1.98   


 


Sodium    137  (136-145)  mEq/L


 


Potassium    5.5 H  (3.5-5.1)  mEq/L


 


Chloride    102  ()  mEq/L


 


Carbon Dioxide    25  (21-32)  mEq/L


 


Anion Gap    15.5 H  (5-15)  


 


BUN    32 H  (7-18)  mg/dL


 


Creatinine    1.6 H  (0.55-1.02)  mg/dL


 


Est Cr Clr Drug Dosing    25.38  mL/min


 


Estimated GFR (MDRD)    31  (>60)  mL/min


 


BUN/Creatinine Ratio    20.0 H  (14-18)  


 


Glucose    136 H  ()  mg/dL


 


Calcium    8.8  (8.5-10.1)  mg/dL


 


Magnesium    2.4  (1.8-2.4)  mg/dl


 


Total Bilirubin    1.0  (0.2-1.0)  mg/dL


 


AST    34  (15-37)  U/L


 


ALT    49  (14-59)  U/L


 


Alkaline Phosphatase    83  ()  U/L


 


Total Protein    6.8  (6.4-8.2)  g/dl


 


Albumin    3.1 L  (3.4-5.0)  g/dl


 


Globulin    3.7  gm/dL


 


Albumin/Globulin Ratio    0.8 L  (1-2)  


 


Lipase    131  ()  U/L











Med Orders - Current: 


 Current Medications





Hydrocodone Bitart/Acetaminophen (Norco 325-5 Mg)  1 tab PO Q4H PRN


   PRN Reason: Pain


   Last Admin: 01/08/18 16:26 Dose:  1 tab


Amitriptyline HCl (Elavil)  25 mg PO BEDTIME [facility]


   Last Admin: 01/07/18 20:30 Dose:  25 mg


Clopidogrel Bisulfate (Plavix)  75 mg PO DAILY [facility]


   Last Admin: 01/08/18 09:11 Dose:  75 mg


Docusate Sodium (Colace)  100 mg PO BID [facility]


   Last Admin: 01/08/18 09:11 Dose:  100 mg


Hydromorphone HCl (Dilaudid)  0.5 mg IVPUSH Q4H PRN


   PRN Reason: Pain


   Last Admin: 01/08/18 17:35 Dose:  0.5 mg


Lactated Ringer's (Ringers, Lactated)  1,000 mls @ 100 mls/hr IV ASDIRECTED [facility]


   Stop: 01/08/18 22:00


   Last Admin: 01/08/18 16:27 Dose:  100 mls/hr


Levothyroxine Sodium (Synthroid)  88 mcg PO ACBREAKFAST [facility]


   Last Admin: 01/08/18 06:26 Dose:  88 mcg


Magnesium Oxide (Magnesium Oxide)  400 mg PO BID [facility]


   Last Admin: 01/08/18 09:11 Dose:  400 mg


Metoprolol Tartrate (Lopressor)  50 mg PO BID@1200,2100 [facility]


   Last Admin: 01/08/18 13:10 Dose:  50 mg


Ondansetron HCl (Zofran)  4 mg IVPUSH Q8H PRN


   PRN Reason: Nausea/Vomiting


   Last Admin: 01/07/18 13:13 Dose:  4 mg


Ptom: Ubidecarenone (100mg (Coenzyme-Q))  1 each PO DAILY [facility]


   Last Admin: 01/08/18 09:17 Dose:  Not Given


Polyethylene Glycol (Miralax)  17 gm PO BEDTIME PRN


   PRN Reason: Constipation


   Last Admin: 01/07/18 08:26 Dose:  17 gm


Simvastatin (Zocor)  10 mg PO BEDTIME [facility]


   Last Admin: 01/07/18 20:30 Dose:  10 mg


Sodium Chloride (Saline Flush)  10 ml FLUSH ONETIME PRN


   PRN Reason: IV FLUSH


   Last Admin: 01/05/18 10:03 Dose:  10 ml


Vit A/Vit C/Vit E/Selen/Cu/Zn/Lutei (Icaps Mv)  1 tab PO BID [facility]


   Last Admin: 01/08/18 09:11 Dose:  1 tab


Warfarin Sodium (Coumadin)  7.5 mg PO MoWeFr@1800 [facility]


   Last Admin: 01/05/18 17:37 Dose:  7.5 mg


Warfarin Sodium (Coumadin)  5 mg PO SuTuThSa@1800 [facility]


   Last Admin: 01/07/18 17:00 Dose:  5 mg





Discontinued Medications





Bisacodyl (Dulcolax)  10 mg RECTAL ONETIME ONE


   Stop: 01/07/18 21:33


   Last Admin: 01/08/18 00:27 Dose:  Not Given


Bisacodyl (Dulcolax)  10 mg RECTAL ONETIME ONE


   Stop: 01/07/18 21:53


   Last Admin: 01/07/18 22:03 Dose:  10 mg


Enoxaparin Sodium (Lovenox)  40 mg SUBCUT DAILY [facility]


   Last Admin: 01/05/18 10:01 Dose:  Not Given


Fentanyl (Duragesic)  12 mcg TRDERM Q72H [facility]


   Last Admin: 01/04/18 18:47 Dose:  12 mcg


Fentanyl (Duragesic)  12 mcg TRDERM Q72H [facility]


   Last Admin: 01/07/18 11:50 Dose:  12 mcg


Hydromorphone HCl (Dilaudid)  1 mg IVPUSH ONETIME ONE


   Stop: 01/04/18 11:07


   Last Admin: 01/04/18 11:10 Dose:  1 mg


Hydromorphone HCl (Dilaudid) Confirm Administered Dose 1 mg .ROUTE .STK-MED ONE


   Stop: 01/04/18 11:13


   Last Admin: 01/04/18 11:16 Dose:  Not Given


Hydromorphone HCl (Dilaudid)  1 mg IVPUSH Q6H PRN


   PRN Reason: Pain (severe 7-10)


   Last Admin: 01/04/18 18:19 Dose:  1 mg


Hydromorphone HCl (Dilaudid)  1 mg IVPUSH Q2H PRN


   PRN Reason: Pain


   Last Admin: 01/07/18 07:54 Dose:  1 mg


Sodium Chloride (Normal Saline)  500 mls @ 250 mls/hr IV .BOLUS ONE


   Stop: 01/04/18 12:35


   Last Admin: 01/04/18 10:42 Dose:  250 mls/hr


Sodium Chloride (Normal Saline) Confirm Administered Dose 1,000 mls @ as 

directed .ROUTE .STK-MED ONE


   Stop: 01/04/18 10:45


   Last Admin: 01/04/18 10:43 Dose:  Not Given


Lactated Ringer's (Ringers, Lactated)  1,000 mls @ 75 mls/hr IV ASDIRECTED [facility]


   Stop: 01/05/18 06:00


   Last Admin: 01/04/18 20:00 Dose:  75 mls/hr


Sodium Chloride (Normal Saline)  250 mls @ 250 mls/hr IV .BOLUS ONE


   Stop: 01/07/18 20:49


   Last Admin: 01/07/18 20:06 Dose:  Not Given


Sodium Chloride (Normal Saline)  500 mls @ 500 mls/hr IV .BOLUS ONE


   Stop: 01/07/18 20:49


   Last Admin: 01/07/18 20:10 Dose:  Not Given


Sodium Chloride (Normal Saline)  250 mls @ 250 mls/hr IV .BOLUS ONE


   Stop: 01/07/18 20:56


   Last Admin: 01/07/18 20:28 Dose:  250 mls/hr


Iopamidol (Isovue-300 (61%))  100 ml IVPUSH ONETIME ONE


   Stop: 01/04/18 10:44


   Last Admin: 01/04/18 11:23 Dose:  100 ml


Ketorolac Tromethamine (Toradol)  15 mg IVPUSH Q8H [facility]


   Stop: 01/06/18 10:01


   Last Admin: 01/06/18 10:17 Dose:  15 mg


Ketorolac Tromethamine (Toradol)  30 mg IVPUSH ONETIME ONE


   Stop: 01/07/18 11:22


   Last Admin: 01/07/18 11:49 Dose:  30 mg


Ketorolac Tromethamine (Toradol)  15 mg IVPUSH Q6H [facility]


   Stop: 01/08/18 11:31


Ketorolac Tromethamine (Toradol)  15 mg IVPUSH Q6H [facility]


   Stop: 01/08/18 09:31


   Last Admin: 01/08/18 09:11 Dose:  15 mg


Magnesium Oxide (Magnesium Oxide)  400 mg PO DAILY [facility]


   Last Admin: 01/07/18 08:25 Dose:  400 mg


Methylprednisolone Sodium Succinate (Solu-Medrol)  125 mg IVPUSH ONETIME ONE


   Stop: 01/07/18 19:53


   Last Admin: 01/07/18 20:28 Dose:  125 mg


Metoprolol Succinate (Toprol Xl)  100 mg PO DAILY [facility]


   Last Admin: 01/05/18 09:49 Dose:  100 mg


Metoprolol Tartrate (Lopressor)  50 mg PO Q12HR [facility]


   Last Admin: 01/06/18 09:47 Dose:  Not Given


Metoprolol Tartrate (Lopressor)  50 mg PO ONETIME ONE


   Stop: 01/07/18 09:27


   Last Admin: 01/07/18 09:35 Dose:  Not Given


Miscellaneous Information (Remove Patch)  1 ea TRDERM Q72H [facility]


Miscellaneous Information (Remove Patch)  1 ea TRDERM Q72H [facility]


Miscellaneous Information (Remove Patch)  1 ea TRDERM NOW STA


   Stop: 01/07/18 19:54


   Last Admin: 01/07/18 20:41 Dose:  1 ea


Morphine Sulfate (Morphine)  2 mg IVPUSH ONETIME ONE


   Stop: 01/04/18 10:36


   Last Admin: 01/04/18 10:40 Dose:  2 mg


Morphine Sulfate (Morphine)  2 mg IVPUSH ONETIME ONE


   Stop: 01/04/18 16:49


   Last Admin: 01/04/18 18:05 Dose:  Not Given


Scopolamine (Scopolamine)  1 each TRDERM Q72H PRN


   PRN Reason: Nausea


Vit A/Vit C/Vit E/Selen/Cu/Zn/Lutei (Icaps Mv)  2 tab PO BID [facility]


   Last Admin: 01/06/18 21:22 Dose:  1 tab











- Exam


Quality Assessment: Supplemental Oxygen


General: Alert, Oriented


Lungs: Decreased Breath Sounds (left base )


GI/Abdominal Exam: Non-Tender, Distended





Consult PN Assessment/Plan


Procedures: 


Procedures





ASSAY OF AMYLASE (10/31/15)


ASSAY OF LACTIC ACID (10/31/15)


ASSAY OF LIPASE (10/31/15)


ASSAY OF MAGNESIUM (10/31/15)


ASSAY OF PHOSPHORUS (10/31/15)


ASSAY OF TROPONIN QUANT (12/25/17)


ASSAY THYROID STIM HORMONE (12/25/17)


CHEST X-RAY 1 VIEW FRONTAL (12/25/17)


COMPLETE CBC W/AUTO DIFF WBC (12/25/17)


COMPREHEN METABOLIC PANEL (12/25/17)


CT ABD & PELV W/CONTRAST (10/31/15)


CT ABD & PELVIS W/O CONTRAST (11/05/15)


CT THORAX W/DYE (10/31/15)


CT THORAX W/O DYE (05/23/16)


DXA BONE DENSITY AXIAL (03/16/17)


ELECTROCARDIOGRAM TRACING (12/25/17)


EMERGENCY DEPT VISIT (12/25/17)


EMERGENCY DEPT VISIT (06/08/15)


FIBRIN DEGRADATION QUANT (11/05/15)


GLYCOSYLATED HEMOGLOBIN TEST (11/17/14)


HYDRATE IV INFUSION ADD-ON (10/31/15)


LIPID PANEL (04/20/15)


MICROBE SUSCEPTIBLE SUMAN (10/31/15)


ROUTINE VENIPUNCTURE (12/25/17)


RPR S/N/AX/GEN/TRNK 2.5CM/< (06/08/15)


THER/PROPH/DIAG IV INF ADDON (12/25/17)


THER/PROPH/DIAG IV INF INIT (12/25/17)


TISSUE EXAM BY PATHOLOGIST (11/04/15)


TX/PRO/DX INJ NEW DRUG ADDON (10/31/15)


TX/PRO/DX INJ SAME DRUG ADON (12/25/17)


URINALYSIS AUTO W/SCOPE (10/31/15)


URINE BACTERIA CULTURE (10/31/15)


URINE CULTURE/COLONY COUNT (10/31/15)








Problem List Initiated/Reviewed/Updated: Yes


My Orders Last 24 Hours: 


My Active Orders





01/07/18 21:31


Enema [RC] ASDIRECTED 





01/08/18 09:02


Enema [RC] ASDIRECTED 





01/08/18 17:35


Chest [US] Routine 











Plan: 





hb has come up after IV fluids 


CXR shows a large collection of fluid in the left pleural space 


Hb dsropp 13 to 11 


ass bleedin pleural space 


ass hemothorax


plan do ct scan 





CT of chest done showing hemothorax, discussed with Dr. Rangel and he would like 

a CT place will do this and hold the anticoagulation discussed he procedure 

with the pt risk and complictions she understand and consents.

## 2018-01-08 NOTE — CT
CT chest

 

Technique: Multiple axial sections through the chest were obtained.  

Intravenous contrast not utilized.

 

Comparison: Prior chest CT of 01/04/18.

 

Findings: Moderately large left-sided pleural effusion is seen showing

 loculations.  This pleural effusion causes compressive atelectasis 

within the left lung base.  Findings most likely due to hemothorax.  

Rib fractures are again seen within the left side of the chest.  Right

 lung remains clear.  Atherosclerotic calcification is noted within 

the thoracic aorta with ectasia of the aorta.  Incidental calcified 

bilateral breast prosthesis are noted.

 

Bone window settings shows mild scattered degenerative change within 

the spinal.  Mild artifact is noted from sternotomy wires.

 

Impression:

1.  Moderately large left-sided pleural effusion showing loculations 

which is felt compatible with hemothorax.

2.  Pleural effusion causes compressive left lower lobe atelectasis.

3.  No pneumothorax is seen.  Stable left-sided rib fractures are 

present.

 

Diagnostic code #3

## 2018-01-08 NOTE — CR
Chest: Expiratory view of the chest was obtained.

 

Comparison: Prior chest CT performed earlier on the same day.

 

Left-sided pneumothorax is seen measuring around 30%.  Left basilar 

chest tube is seen.  Parenchymal density is noted within the left mid 

and lower lung compatible with continuing atelectasis.  Left-sided rib

 fractures are again seen.  Sternotomy is noted.  Calcified breast 

prosthesis are again noted.

 

Impression:

1.  Left basilar chest tube.

2.  Left-sided pneumothorax measuring around 30%.

3.  Significant atelectasis remains within the mid and lower lung.

4.  Other incidental findings.

 

Diagnostic code #3

## 2018-01-08 NOTE — CONS
CONSULTING PHYSICIAN:  Percy Haynes MD

DATE OF CONSULTATION:  01/07/2018

 

HISTORY OF PRESENT ILLNESS:

The patient is an 82-year-old who was admitted on Thursday with a fall, rib

fractures on the left.  The patient has been treated with oral pain medication,

Duragesic patch, and IV Dilaudid.  The patient states it has been difficult to

control her pain and keep blood pressure up since her blood pressure did drop

and Duragesic patch was removed and she was given IV fluids and her blood

pressure resorted to normal.  The patient currently at the time of this

discussion states that she has had some pain over the left lateral chest and

down into the left side and into the breast.  She does not complain of any

epigastric pain, any back pain.  There is no nausea and vomiting, but it is

clear she has not had a bowel movement for a number of days.  She is able to

recall stable findings.

 

PHYSICAL EXAMINATION:

GENERAL:  Reveals an alert female.  She has some confusions about her history.

VITAL SIGNS:  Her blood pressure is 123/58, it dropped to 85/47, now 91/64; her

pulse is 85; and her sats are 99% on 3 L.

ABDOMEN:  Shows tympany and distended, but no tenderness noted.

UPPER AND LOWER EXTREMITIES:  No angulation deformities.

 

REVIEW OF SYSTEMS:

No nausea or vomiting.

 

LABORATORY DATA:

Revealed showing elevated lipase twice normal, about 600 with a mild elevation

of the bilirubin.

 

ASSESSMENT:

Rib fractures, accounts for her discomfort.  Does not really describe any

particular abdominal pain that could be associated with pancreatitis, although

the lipase is elevated.  However, she does need to have a bowel movement and

recommendation of course

is to give laxatives and pull back on the Duragesic patch and other Dilaudid,

which is being done as we talk and we will follow the patient up in the morning.

 

DD:  01/07/2018 21:40:58

DT:  01/08/2018 02:41:16  MMODAL

Job #:  [id number]

## 2018-01-08 NOTE — CR
Abdomen: Supine and upright views of the abdomen were obtained.

 

Comparison: No prior abdominal x-ray, prior CT abdomen and pelvis exam

 of 01/04/18.

 

Air-fluid levels are identified on the upright view within small bowel

 and colon.  Findings most likely represent an ileus.  Surgical clips 

are seen from prior cholecystectomy.  No free air is seen.  Incidental

 calcified breast prosthesis are noted.  Bony structures are 

osteopenic.

 

Impression:

1.  Air-fluid levels within colon and small bowel most likely 

representing ileus.

2.  Other incidental findings.

 

Diagnostic code #3

## 2018-01-08 NOTE — PCM.OPNOTE
- General Post-Op/Procedure Note


Date of Surgery/Procedure: 01/08/18


Operative Procedure(s): place chest tube 40F


Findings: 





1000cc of bloody fluid


Pre Op Diagnosis: hemothorax


Post-Op Diagnosis: Same


Anesthesia Technique: Local


Primary Surgeon: Percy Haynes


Condition: Fair


Free Text/Narrative:: 


 Intake & Output











 01/08/18 01/08/18 01/08/18





 07:59 15:59 23:59


 


Intake Total 450 360 


 


Output Total 600  


 


Balance -150 360

## 2018-01-08 NOTE — CR
Chest: Two views of the chest were obtained.

 

Comparison: Prior chest x-ray of 01/05/18.

 

Large left-sided pleural effusion is seen possibly due to hemothorax. 

 Left-sided rib fractures are again seen.  Right lung is clear.  

Cardiac silhouette difficult to see.  Tortuous thoracic aorta is seen.

  Sternotomy wires are noted.  Calcified breast prosthesis are seen.

 

Impression:

1.  Large left sided pleural effusion has developed as an interval 

change from prior chest x-ray.  This may represent hemothorax from 

previous rib fractures.

2.  Right lung is clear.

 

Diagnostic code #5

 

 called report to Nathalia LOPEZ for Dr. Tere Palacios at 0836 on 01/08/2018

## 2018-01-08 NOTE — [id number]
**Discharge Summary





- Hospital Course


HPI Initial Comments: 


82 year old female s/p fall, hitting furniture with her back and left side.  

She felt dizzy after taking a hand full of her cardiac medication on an empty 

stomach.  She has had a radiographic study that documents multiple left sided 

rib fractures.  She is on coumadin as well as plavix for coronary stents.  The 

patient stated that her metoprolol had been recently increased.  She has had no 

N/V, SOB, CP, orthopnea, PND, syncopal/presyncopal episodes.  The patient will 

be admitted to obs with telemetry.





- Discharge Data


Discharge Date: 01/08/18 (Admit date: 01/05/18)


Discharge Disposition: DC/Tfer to Acute Hospital 02


Condition: Fair





- Discharge Diagnosis/Problem(s)


(1) Multiple fractures of ribs, left side, initial encounter for closed fracture


SNOMED Code(s): 12301189


   ICD Code: S22.42XA - MULTIPLE FRACTURES OF RIBS, LEFT SIDE, INIT FOR CLOS FX

   Status: Acute   Priority: High   Current Visit: Yes   





(2) Atrial fibrillation with RVR


SNOMED Code(s): 258754664113075


   ICD Code: I48.91 - UNSPECIFIED ATRIAL FIBRILLATION   Status: Chronic   

Priority: Low   Current Visit: No   





(3) Chest pain


SNOMED Code(s): 31860003


   ICD Code: R07.9 - CHEST PAIN, UNSPECIFIED   Status: Acute   Priority: Medium

   Current Visit: No   


Qualifiers: 


   Chest pain type: unspecified   Qualified Code(s): R07.9 - Chest pain, 

unspecified   





(4) Hypertension


SNOMED Code(s): 28790137


   ICD Code: I10 - ESSENTIAL (PRIMARY) HYPERTENSION   Status: Chronic   Priority

: Medium   Current Visit: No   


Qualifiers: 


   Hypertension type: essential hypertension   Qualified Code(s): I10 - 

Essential (primary) hypertension   





(5) Hyperkalemia


SNOMED Code(s): 69658451


   ICD Code: E87.5 - HYPERKALEMIA   Status: Acute   Priority: Medium   Current 

Visit: Yes   





(6) Hemothorax on left


SNOMED Code(s): 04763689


   ICD Code: J94.2 - HEMOTHORAX   Status: Acute   Priority: High   Current Visit

: Yes   





- Patient Summary/Data


Consults: 


 Consultations





01/08/18 09:41


Consult to Physician [CONS] Routine 














- Patient Instructions


Diet: Heart Healthy Diet





- Discharge Plan


Home Medications: 


 Home Meds





Alpha Lipoic Acid [Alpha-Lipoic Acid] 200 mg PO DAILY 06/08/15 [History]


Amitriptyline [Elavil] 25 mg PO BEDTIME 06/08/15 [History]


Cinnamon Bark [Cinnamon] 500 mg PO DAILY 06/08/15 [History]


Fish Oil/Omega-3 Fatty Acids [Fish Oil] 1,200 mg PO DAILY 06/08/15 [History]


Metoprolol Succinate [Toprol XL] 100 mg PO DAILY 06/08/15 [History]


Ubidecarenone [Co Q-10] 100 mg PO DAILY 06/08/15 [History]


Clopidogrel [Plavix] 75 mg PO DAILY 12/25/17 [History]


Levothyroxine [Synthroid] 88 mcg PO DAILY 12/25/17 [History]


Magnesium 250 mg PO DAILY 12/25/17 [History]


Multivit-Min/FA/Lycopene/Lut [Centrum Silver Tablet] 1 tab PO DAILY 12/25/17 [

History]


Eliseo Colon Health  1 tab PO DAILY 12/25/17 [History]


Vit A/Vit C/Vit E/Zinc/Copper [Icaps Areds Formula] 1 tab PO BID 12/25/17 [

History]


Warfarin [Coumadin] 5 mg PO SUTUTHSA 12/25/17 [History]


Warfarin [Coumadin] 7.5 mg PO MOWEFR 12/25/17 [History]


atorvaSTATin [Lipitor] 10 mg PO DAILY 12/25/17 [History]








Forms:  ED Department Discharge


Referrals: 


Ryne Arvizu MD [Primary Care Provider] - 





- General Info


Date of Service: 01/08/18


Admission Dx/Problem (Free Text: 


 Admission Diagnosis/Problem





Admission Diagnosis/Problem      Rib injury








Subjective Update: 


In to see Zuri. She is sitting in the chair and doing well. She reports she 

feels better than yesterday. She reports pain with deep inspiration and 

movement unless she "moves just right." She was up walking with PT today. CT 

notes a large left sided effusion with loculations which is felt compatible 

with a hemothorax. It is also noted to be causing compression atelectasis of 

the left lower lobe.  Rib fractures are noted to be stable. Dr. Haynes has been 

consulted and will see the patient later today. Her Anion gap is elevated, as 

is her creatinine and potasium. Will order 100ml/hr for 6 hrs. to rehydrate, 

using caution as she has had some weight gain. Pain is currently controlled. 

She did have a small BM today per her report. 





- Review of Systems


General: Reports: Weakness, Fatigue, Malaise.  Denies: Fever, Chills


HEENT: Reports: Other (Dry throat and nose ).  Denies: Dysphasia, Glasses, 

Headaches, Sore Throat


Pulmonary: Reports: Shortness of Breath, Pleuritic Chest Pain.  Denies: Cough, 

Sputum, Hemoptysis, Wheezing


Cardiovascular: Reports: Dyspnea on Exertion.  Denies: Chest Pain, Palpitations

, Orthopnea, Edema, Lightheadedness


Gastrointestinal: Reports: Constipation.  Denies: Abdominal Pain, Diarrhea, 

Nausea, Vomiting


Genitourinary: Reports: No Symptoms.  Denies: Dysuria, Frequency, Burning, Pain

, Urgency


Musculoskeletal: Reports: Other (left sided chest pain wiht movement )


Skin: Reports: No Symptoms


Neurological: Reports: No Symptoms


Psychiatric: Reports: No Symptoms





- Patient Data


Vitals - Most Recent: 


 Last Vital Signs











Temp  97.3 F   01/08/18 08:54


 


Pulse  83   01/08/18 13:10


 


Resp  19   01/08/18 08:54


 


BP  138/61   01/08/18 13:10


 


Pulse Ox  93 L  01/08/18 08:54











Weight - Most Recent: 192 lb


I&O - Last 24 hours: 


 Intake & Output











 01/08/18 01/08/18 01/08/18





 06:59 14:59 22:59


 


Intake Total 450 360 


 


Output Total 600  


 


Balance -150 360 











Lab Results - Last 24 hrs: 


 Laboratory Results - last 24 hr











  01/08/18 01/08/18 01/08/18 Range/Units





  06:05 06:05 06:05 


 


WBC  16.52 H    (3.98-10.04)  K/mm3


 


RBC  3.57 L    (3.98-5.22)  M/mm3


 


Hgb  11.1 L    (11.2-15.7)  gm/L


 


Hct  33.0 L    (34.1-44.9)  %


 


MCV  92.4    (79.4-94.8)  fl


 


MCH  31.1    (25.6-32.2)  pg


 


MCHC  33.6    (32.2-35.5)  g/dl


 


RDW Std Deviation  43.7    (36.4-46.3)  fL


 


Plt Count  257    (182-369)  K/mm3


 


MPV  9.8    (9.4-12.3)  fl


 


Neut % (Auto)  88.4 H    (34.0-71.1)  %


 


Lymph % (Auto)  8.5 L    (19.3-51.7)  %


 


Mono % (Auto)  2.6 L    (4.7-12.5)  %


 


Eos % (Auto)  0.1 L    (0.7-5.8)  


 


Baso % (Auto)  0.0 L    (0.1-1.2)  %


 


Neut # (Auto)  14.61 H    (1.56-6.13)  K/mm3


 


Lymph # (Auto)  1.41    (1.18-3.74)  K/mm3


 


Mono # (Auto)  0.43 H    (0.24-0.36)  K/mm3


 


Eos # (Auto)  0.01 L    (0.04-0.36)  K/mm3


 


Baso # (Auto)  0.00 L    (0.01-0.08)  K/mm3


 


Manual Slide Review  Abnormal smear    


 


PT   22.6 H   (8.0-13.0)  SECONDS


 


INR   1.98   


 


Sodium    137  (136-145)  mEq/L


 


Potassium    5.5 H  (3.5-5.1)  mEq/L


 


Chloride    102  ()  mEq/L


 


Carbon Dioxide    25  (21-32)  mEq/L


 


Anion Gap    15.5 H  (5-15)  


 


BUN    32 H  (7-18)  mg/dL


 


Creatinine    1.6 H  (0.55-1.02)  mg/dL


 


Est Cr Clr Drug Dosing    25.38  mL/min


 


Estimated GFR (MDRD)    31  (>60)  mL/min


 


BUN/Creatinine Ratio    20.0 H  (14-18)  


 


Glucose    136 H  ()  mg/dL


 


Calcium    8.8  (8.5-10.1)  mg/dL


 


Magnesium    2.4  (1.8-2.4)  mg/dl


 


Total Bilirubin    1.0  (0.2-1.0)  mg/dL


 


AST    34  (15-37)  U/L


 


ALT    49  (14-59)  U/L


 


Alkaline Phosphatase    83  ()  U/L


 


Total Protein    6.8  (6.4-8.2)  g/dl


 


Albumin    3.1 L  (3.4-5.0)  g/dl


 


Globulin    3.7  gm/dL


 


Albumin/Globulin Ratio    0.8 L  (1-2)  


 


Lipase    131  ()  U/L











Med Orders - Current: 


 Current Medications





Hydrocodone Bitart/Acetaminophen (Norco 325-5 Mg)  1 tab PO Q4H PRN


   PRN Reason: Pain


   Last Admin: 01/08/18 16:26 Dose:  1 tab


Amitriptyline HCl (Elavil)  25 mg PO BEDTIME [facility]


   Last Admin: 01/07/18 20:30 Dose:  25 mg


Clopidogrel Bisulfate (Plavix)  75 mg PO DAILY [facility]


   Last Admin: 01/08/18 09:11 Dose:  75 mg


Docusate Sodium (Colace)  100 mg PO BID [facility]


   Last Admin: 01/08/18 09:11 Dose:  100 mg


Hydromorphone HCl (Dilaudid)  0.5 mg IVPUSH Q4H PRN


   PRN Reason: Pain


Lactated Ringer's (Ringers, Lactated)  1,000 mls @ 100 mls/hr IV ASDIRECTED [facility]


   Stop: 01/08/18 22:00


   Last Admin: 01/08/18 16:27 Dose:  100 mls/hr


Levothyroxine Sodium (Synthroid)  88 mcg PO ACBREAKFAST [facility]


   Last Admin: 01/08/18 06:26 Dose:  88 mcg


Magnesium Oxide (Magnesium Oxide)  400 mg PO BID [facility]


   Last Admin: 01/08/18 09:11 Dose:  400 mg


Metoprolol Tartrate (Lopressor)  50 mg PO BID@1200,2100 [facility]


   Last Admin: 01/08/18 13:10 Dose:  50 mg


Ondansetron HCl (Zofran)  4 mg IVPUSH Q8H PRN


   PRN Reason: Nausea/Vomiting


   Last Admin: 01/07/18 13:13 Dose:  4 mg


Ptom: Ubidecarenone (100mg (Coenzyme-Q))  1 each PO DAILY [facility]


   Last Admin: 01/08/18 09:17 Dose:  Not Given


Polyethylene Glycol (Miralax)  17 gm PO BEDTIME PRN


   PRN Reason: Constipation


   Last Admin: 01/07/18 08:26 Dose:  17 gm


Simvastatin (Zocor)  10 mg PO BEDTIME [facility]


   Last Admin: 01/07/18 20:30 Dose:  10 mg


Sodium Chloride (Saline Flush)  10 ml FLUSH ONETIME PRN


   PRN Reason: IV FLUSH


   Last Admin: 01/05/18 10:03 Dose:  10 ml


Vit A/Vit C/Vit E/Selen/Cu/Zn/Lutei (Icaps Mv)  1 tab PO BID [facility]


   Last Admin: 01/08/18 09:11 Dose:  1 tab


Warfarin Sodium (Coumadin)  7.5 mg PO MoWeFr@1800 [facility]


   Last Admin: 01/05/18 17:37 Dose:  7.5 mg


Warfarin Sodium (Coumadin)  5 mg PO SuTuThSa@1800 [facility]


   Last Admin: 01/07/18 17:00 Dose:  5 mg





Discontinued Medications





Bisacodyl (Dulcolax)  10 mg RECTAL ONETIME ONE


   Stop: 01/07/18 21:33


   Last Admin: 01/08/18 00:27 Dose:  Not Given


Bisacodyl (Dulcolax)  10 mg RECTAL ONETIME ONE


   Stop: 01/07/18 21:53


   Last Admin: 01/07/18 22:03 Dose:  10 mg


Enoxaparin Sodium (Lovenox)  40 mg SUBCUT DAILY [facility]


   Last Admin: 01/05/18 10:01 Dose:  Not Given


Fentanyl (Duragesic)  12 mcg TRDERM Q72H [facility]


   Last Admin: 01/04/18 18:47 Dose:  12 mcg


Fentanyl (Duragesic)  12 mcg TRDERM Q72H [facility]


   Last Admin: 01/07/18 11:50 Dose:  12 mcg


Hydromorphone HCl (Dilaudid)  1 mg IVPUSH ONETIME ONE


   Stop: 01/04/18 11:07


   Last Admin: 01/04/18 11:10 Dose:  1 mg


Hydromorphone HCl (Dilaudid) Confirm Administered Dose 1 mg .ROUTE .STK-MED ONE


   Stop: 01/04/18 11:13


   Last Admin: 01/04/18 11:16 Dose:  Not Given


Hydromorphone HCl (Dilaudid)  1 mg IVPUSH Q6H PRN


   PRN Reason: Pain (severe 7-10)


   Last Admin: 01/04/18 18:19 Dose:  1 mg


Hydromorphone HCl (Dilaudid)  1 mg IVPUSH Q2H PRN


   PRN Reason: Pain


   Last Admin: 01/07/18 07:54 Dose:  1 mg


Sodium Chloride (Normal Saline)  500 mls @ 250 mls/hr IV .BOLUS ONE


   Stop: 01/04/18 12:35


   Last Admin: 01/04/18 10:42 Dose:  250 mls/hr


Sodium Chloride (Normal Saline) Confirm Administered Dose 1,000 mls @ as 

directed .ROUTE .STK-MED ONE


   Stop: 01/04/18 10:45


   Last Admin: 01/04/18 10:43 Dose:  Not Given


Lactated Ringer's (Ringers, Lactated)  1,000 mls @ 75 mls/hr IV ASDIRECTED [facility]


   Stop: 01/05/18 06:00


   Last Admin: 01/04/18 20:00 Dose:  75 mls/hr


Sodium Chloride (Normal Saline)  250 mls @ 250 mls/hr IV .BOLUS ONE


   Stop: 01/07/18 20:49


   Last Admin: 01/07/18 20:06 Dose:  Not Given


Sodium Chloride (Normal Saline)  500 mls @ 500 mls/hr IV .BOLUS ONE


   Stop: 01/07/18 20:49


   Last Admin: 01/07/18 20:10 Dose:  Not Given


Sodium Chloride (Normal Saline)  250 mls @ 250 mls/hr IV .BOLUS ONE


   Stop: 01/07/18 20:56


   Last Admin: 01/07/18 20:28 Dose:  250 mls/hr


Iopamidol (Isovue-300 (61%))  100 ml IVPUSH ONETIME ONE


   Stop: 01/04/18 10:44


   Last Admin: 01/04/18 11:23 Dose:  100 ml


Ketorolac Tromethamine (Toradol)  15 mg IVPUSH Q8H [facility]


   Stop: 01/06/18 10:01


   Last Admin: 01/06/18 10:17 Dose:  15 mg


Ketorolac Tromethamine (Toradol)  30 mg IVPUSH ONETIME ONE


   Stop: 01/07/18 11:22


   Last Admin: 01/07/18 11:49 Dose:  30 mg


Ketorolac Tromethamine (Toradol)  15 mg IVPUSH Q6H [facility]


   Stop: 01/08/18 11:31


Ketorolac Tromethamine (Toradol)  15 mg IVPUSH Q6H [facility]


   Stop: 01/08/18 09:31


   Last Admin: 01/08/18 09:11 Dose:  15 mg


Magnesium Oxide (Magnesium Oxide)  400 mg PO DAILY [facility]


   Last Admin: 01/07/18 08:25 Dose:  400 mg


Methylprednisolone Sodium Succinate (Solu-Medrol)  125 mg IVPUSH ONETIME ONE


   Stop: 01/07/18 19:53


   Last Admin: 01/07/18 20:28 Dose:  125 mg


Metoprolol Succinate (Toprol Xl)  100 mg PO DAILY [facility]


   Last Admin: 01/05/18 09:49 Dose:  100 mg


Metoprolol Tartrate (Lopressor)  50 mg PO Q12HR [facility]


   Last Admin: 01/06/18 09:47 Dose:  Not Given


Metoprolol Tartrate (Lopressor)  50 mg PO ONETIME ONE


   Stop: 01/07/18 09:27


   Last Admin: 01/07/18 09:35 Dose:  Not Given


Miscellaneous Information (Remove Patch)  1 ea TRDERM Q72H [facility]


Miscellaneous Information (Remove Patch)  1 ea TRDERM Q72H [facility]


Miscellaneous Information (Remove Patch)  1 ea TRDERM NOW STA


   Stop: 01/07/18 19:54


   Last Admin: 01/07/18 20:41 Dose:  1 ea


Morphine Sulfate (Morphine)  2 mg IVPUSH ONETIME ONE


   Stop: 01/04/18 10:36


   Last Admin: 01/04/18 10:40 Dose:  2 mg


Morphine Sulfate (Morphine)  2 mg IVPUSH ONETIME ONE


   Stop: 01/04/18 16:49


   Last Admin: 01/04/18 18:05 Dose:  Not Given


Scopolamine (Scopolamine)  1 each TRDERM Q72H PRN


   PRN Reason: Nausea


Vit A/Vit C/Vit E/Selen/Cu/Zn/Lutei (Icaps Mv)  2 tab PO BID [facility]


   Last Admin: 01/06/18 21:22 Dose:  1 tab











- Exam


Quality Assessment: Reports: Supplemental Oxygen, DVT Prophylaxis


General: Reports: Alert, Oriented, Cooperative, No Acute Distress


HEENT: Reports: Pupils Equal, Pupils Reactive, EOMI, Mucous Membr. Moist/Pink


Neck: Reports: Supple, Trachea Midline, No JVD, No Thyromegaly


Lungs: Reports: Normal Respiratory Effort, Decreased Breath Sounds (left side )


Cardiovascular: Reports: Regular Rate, Regular Rhythm


GI/Abdominal Exam: Normal Bowel Sounds, Soft, Non-Tender, No Organomegaly, No 

Distention, No Abnormal Bruit, No Mass, Pelvis Stable


 (Female) Exam: Deferred


Rectal (Female) Exam: Deferred


Back Exam: Reports: Normal Inspection, Decreased Range of Motion (pain on left 

side with movement ), Other (scattered bruising on left side )


Extremities: Normal Range of Motion, Non-Tender, No Pedal Edema, Normal 

Capillary Refill, Other (Scattered bruising on right arm )


Skin: Reports: Warm, Dry, Intact


Neurological: Reports: No New Focal Deficit


Psy/Mental Status: Reports: Alert, Normal Affect, Normal Mood, Other (She does 

occasionally repeat statements she has already made. )





*Q Meaningful Use (DIS)





- VTE *Q


VTE Criteria *Q: 








- Stroke *Q


Stroke Criteria *Q: 








- AMI *Q


AMI Criteria *Q:

## 2018-01-08 NOTE — PCM.PN
- General Info


Date of Service: 01/08/18


Admission Dx/Problem (Free Text): 


 Admission Diagnosis/Problem





Admission Diagnosis/Problem      Rib injury








Subjective Update: 


In to see Zuri. She is sitting in the chair and doing well. She reports she 

feels better than yesterday. She reports pain with deep inspiration and 

movement unless she "moves just right." She was up walking with PT today. CT 

notes a large left sided effusion with loculations which is felt compatible 

with a hemothorax. It is also noted to be causing compression atelectasis of 

the left lower lobe.  Rib fractures are noted to be stable. Dr. Haynes has been 

consulted and will see the patient later today. Her Anion gap is elevated, as 

is her creatinine and potasium. Will order 100ml/hr for 6 hrs. to rehydrate, 

using caution as she has had some weight gain. Pain is currently controlled. 

She did have a small BM today per her report. 


Functional Status: Reports: Pain Controlled, Tolerating Diet, Urinating, 

Incentive Spirometry.  Denies: New Symptoms





- Review of Systems


General: Reports: Weakness (improved over yesterday. ), Fatigue


HEENT: Reports: No Symptoms.  Denies: Eye Pain, Glasses, Headaches, Sore Throat


Pulmonary: Reports: Shortness of Breath, Pleuritic Chest Pain.  Denies: Cough, 

Sputum, Wheezing


Cardiovascular: Denies: Chest Pain, Palpitations, Dyspnea on Exertion, Edema, 

Lightheadedness


Gastrointestinal: Reports: Constipation.  Denies: Abdominal Pain, Diarrhea, 

Nausea, Vomiting


Genitourinary: Denies: Dysuria, Frequency, Burning, Pain, Urgency


Musculoskeletal: Reports: No Symptoms


Skin: Reports: No Symptoms


Neurological: Reports: No Symptoms


Psychiatric: Reports: No Symptoms





- Patient Data


Vitals - Most Recent: 


 Last Vital Signs











Temp  97.3 F   01/08/18 08:54


 


Pulse  83   01/08/18 13:10


 


Resp  19   01/08/18 08:54


 


BP  138/61   01/08/18 13:10


 


Pulse Ox  93 L  01/08/18 08:54











Weight - Most Recent: 192 lb


I&O - Last 24 Hours: 


 Intake & Output











 01/08/18 01/08/18 01/08/18





 06:59 14:59 22:59


 


Intake Total 450 360 


 


Output Total 600  


 


Balance -150 360 











Lab Results Last 24 Hours: 


 Laboratory Results - last 24 hr











  01/07/18 01/08/18 01/08/18 Range/Units





  15:00 06:05 06:05 


 


WBC   16.52 H   (3.98-10.04)  K/mm3


 


RBC   3.57 L   (3.98-5.22)  M/mm3


 


Hgb   11.1 L   (11.2-15.7)  gm/L


 


Hct   33.0 L   (34.1-44.9)  %


 


MCV   92.4   (79.4-94.8)  fl


 


MCH   31.1   (25.6-32.2)  pg


 


MCHC   33.6   (32.2-35.5)  g/dl


 


RDW Std Deviation   43.7   (36.4-46.3)  fL


 


Plt Count   257   (182-369)  K/mm3


 


MPV   9.8   (9.4-12.3)  fl


 


Neut % (Auto)   88.4 H   (34.0-71.1)  %


 


Lymph % (Auto)   8.5 L   (19.3-51.7)  %


 


Mono % (Auto)   2.6 L   (4.7-12.5)  %


 


Eos % (Auto)   0.1 L   (0.7-5.8)  


 


Baso % (Auto)   0.0 L   (0.1-1.2)  %


 


Neut # (Auto)   14.61 H   (1.56-6.13)  K/mm3


 


Lymph # (Auto)   1.41   (1.18-3.74)  K/mm3


 


Mono # (Auto)   0.43 H   (0.24-0.36)  K/mm3


 


Eos # (Auto)   0.01 L   (0.04-0.36)  K/mm3


 


Baso # (Auto)   0.00 L   (0.01-0.08)  K/mm3


 


Manual Slide Review   Abnormal smear   


 


PT    22.6 H  (8.0-13.0)  SECONDS


 


INR    1.98  


 


Sodium     (136-145)  mEq/L


 


Potassium     (3.5-5.1)  mEq/L


 


Chloride     ()  mEq/L


 


Carbon Dioxide     (21-32)  mEq/L


 


Anion Gap     (5-15)  


 


BUN     (7-18)  mg/dL


 


Creatinine     (0.55-1.02)  mg/dL


 


Est Cr Clr Drug Dosing     mL/min


 


Estimated GFR (MDRD)     (>60)  mL/min


 


BUN/Creatinine Ratio     (14-18)  


 


Glucose     ()  mg/dL


 


Calcium     (8.5-10.1)  mg/dL


 


Magnesium     (1.8-2.4)  mg/dl


 


Total Bilirubin     (0.2-1.0)  mg/dL


 


AST     (15-37)  U/L


 


ALT     (14-59)  U/L


 


Alkaline Phosphatase     ()  U/L


 


Troponin I  < 0.017    (0.00-0.056)  ng/mL


 


Total Protein     (6.4-8.2)  g/dl


 


Albumin     (3.4-5.0)  g/dl


 


Globulin     gm/dL


 


Albumin/Globulin Ratio     (1-2)  


 


Lipase     ()  U/L














  01/08/18 Range/Units





  06:05 


 


WBC   (3.98-10.04)  K/mm3


 


RBC   (3.98-5.22)  M/mm3


 


Hgb   (11.2-15.7)  gm/L


 


Hct   (34.1-44.9)  %


 


MCV   (79.4-94.8)  fl


 


MCH   (25.6-32.2)  pg


 


MCHC   (32.2-35.5)  g/dl


 


RDW Std Deviation   (36.4-46.3)  fL


 


Plt Count   (182-369)  K/mm3


 


MPV   (9.4-12.3)  fl


 


Neut % (Auto)   (34.0-71.1)  %


 


Lymph % (Auto)   (19.3-51.7)  %


 


Mono % (Auto)   (4.7-12.5)  %


 


Eos % (Auto)   (0.7-5.8)  


 


Baso % (Auto)   (0.1-1.2)  %


 


Neut # (Auto)   (1.56-6.13)  K/mm3


 


Lymph # (Auto)   (1.18-3.74)  K/mm3


 


Mono # (Auto)   (0.24-0.36)  K/mm3


 


Eos # (Auto)   (0.04-0.36)  K/mm3


 


Baso # (Auto)   (0.01-0.08)  K/mm3


 


Manual Slide Review   


 


PT   (8.0-13.0)  SECONDS


 


INR   


 


Sodium  137  (136-145)  mEq/L


 


Potassium  5.5 H  (3.5-5.1)  mEq/L


 


Chloride  102  ()  mEq/L


 


Carbon Dioxide  25  (21-32)  mEq/L


 


Anion Gap  15.5 H  (5-15)  


 


BUN  32 H  (7-18)  mg/dL


 


Creatinine  1.6 H  (0.55-1.02)  mg/dL


 


Est Cr Clr Drug Dosing  25.38  mL/min


 


Estimated GFR (MDRD)  31  (>60)  mL/min


 


BUN/Creatinine Ratio  20.0 H  (14-18)  


 


Glucose  136 H  ()  mg/dL


 


Calcium  8.8  (8.5-10.1)  mg/dL


 


Magnesium  2.4  (1.8-2.4)  mg/dl


 


Total Bilirubin  1.0  (0.2-1.0)  mg/dL


 


AST  34  (15-37)  U/L


 


ALT  49  (14-59)  U/L


 


Alkaline Phosphatase  83  ()  U/L


 


Troponin I   (0.00-0.056)  ng/mL


 


Total Protein  6.8  (6.4-8.2)  g/dl


 


Albumin  3.1 L  (3.4-5.0)  g/dl


 


Globulin  3.7  gm/dL


 


Albumin/Globulin Ratio  0.8 L  (1-2)  


 


Lipase  131  ()  U/L











Med Orders - Current: 


 Current Medications





Hydrocodone Bitart/Acetaminophen (Norco 325-5 Mg)  1 tab PO Q4H PRN


   PRN Reason: Pain


   Last Admin: 01/08/18 10:32 Dose:  1 tab


Amitriptyline HCl (Elavil)  25 mg PO BEDTIME [facility]


   Last Admin: 01/07/18 20:30 Dose:  25 mg


Clopidogrel Bisulfate (Plavix)  75 mg PO DAILY [facility]


   Last Admin: 01/08/18 09:11 Dose:  75 mg


Docusate Sodium (Colace)  100 mg PO BID [facility]


   Last Admin: 01/08/18 09:11 Dose:  100 mg


Hydromorphone HCl (Dilaudid)  0.5 mg IVPUSH Q4H PRN


   PRN Reason: Pain


Levothyroxine Sodium (Synthroid)  88 mcg PO ACBREAKFAST [facility]


   Last Admin: 01/08/18 06:26 Dose:  88 mcg


Magnesium Oxide (Magnesium Oxide)  400 mg PO BID [facility]


   Last Admin: 01/08/18 09:11 Dose:  400 mg


Metoprolol Tartrate (Lopressor)  50 mg PO BID@1200,2100 [facility]


   Last Admin: 01/08/18 13:10 Dose:  50 mg


Ondansetron HCl (Zofran)  4 mg IVPUSH Q8H PRN


   PRN Reason: Nausea/Vomiting


   Last Admin: 01/07/18 13:13 Dose:  4 mg


Ptom: Ubidecarenone (100mg (Coenzyme-Q))  1 each PO DAILY [facility]


   Last Admin: 01/08/18 09:17 Dose:  Not Given


Polyethylene Glycol (Miralax)  17 gm PO BEDTIME PRN


   PRN Reason: Constipation


   Last Admin: 01/07/18 08:26 Dose:  17 gm


Simvastatin (Zocor)  10 mg PO BEDTIME [facility]


   Last Admin: 01/07/18 20:30 Dose:  10 mg


Sodium Chloride (Saline Flush)  10 ml FLUSH ONETIME PRN


   PRN Reason: IV FLUSH


   Last Admin: 01/05/18 10:03 Dose:  10 ml


Vit A/Vit C/Vit E/Selen/Cu/Zn/Lutei (Icaps Mv)  1 tab PO BID [facility]


   Last Admin: 01/08/18 09:11 Dose:  1 tab


Warfarin Sodium (Coumadin)  7.5 mg PO MoWeFr@1800 [facility]


   Last Admin: 01/05/18 17:37 Dose:  7.5 mg


Warfarin Sodium (Coumadin)  5 mg PO SuTuThSa@1800 [facility]


   Last Admin: 01/07/18 17:00 Dose:  5 mg





Discontinued Medications





Bisacodyl (Dulcolax)  10 mg RECTAL ONETIME ONE


   Stop: 01/07/18 21:33


   Last Admin: 01/08/18 00:27 Dose:  Not Given


Bisacodyl (Dulcolax)  10 mg RECTAL ONETIME ONE


   Stop: 01/07/18 21:53


   Last Admin: 01/07/18 22:03 Dose:  10 mg


Enoxaparin Sodium (Lovenox)  40 mg SUBCUT DAILY [facility]


   Last Admin: 01/05/18 10:01 Dose:  Not Given


Fentanyl (Duragesic)  12 mcg TRDERM Q72H [facility]


   Last Admin: 01/04/18 18:47 Dose:  12 mcg


Fentanyl (Duragesic)  12 mcg TRDERM Q72H [facility]


   Last Admin: 01/07/18 11:50 Dose:  12 mcg


Hydromorphone HCl (Dilaudid)  1 mg IVPUSH ONETIME ONE


   Stop: 01/04/18 11:07


   Last Admin: 01/04/18 11:10 Dose:  1 mg


Hydromorphone HCl (Dilaudid) Confirm Administered Dose 1 mg .ROUTE .STK-MED ONE


   Stop: 01/04/18 11:13


   Last Admin: 01/04/18 11:16 Dose:  Not Given


Hydromorphone HCl (Dilaudid)  1 mg IVPUSH Q6H PRN


   PRN Reason: Pain (severe 7-10)


   Last Admin: 01/04/18 18:19 Dose:  1 mg


Hydromorphone HCl (Dilaudid)  1 mg IVPUSH Q2H PRN


   PRN Reason: Pain


   Last Admin: 01/07/18 07:54 Dose:  1 mg


Sodium Chloride (Normal Saline)  500 mls @ 250 mls/hr IV .BOLUS ONE


   Stop: 01/04/18 12:35


   Last Admin: 01/04/18 10:42 Dose:  250 mls/hr


Sodium Chloride (Normal Saline) Confirm Administered Dose 1,000 mls @ as 

directed .ROUTE .STK-MED ONE


   Stop: 01/04/18 10:45


   Last Admin: 01/04/18 10:43 Dose:  Not Given


Lactated Ringer's (Ringers, Lactated)  1,000 mls @ 75 mls/hr IV ASDIRECTED [facility]


   Stop: 01/05/18 06:00


   Last Admin: 01/04/18 20:00 Dose:  75 mls/hr


Sodium Chloride (Normal Saline)  250 mls @ 250 mls/hr IV .BOLUS ONE


   Stop: 01/07/18 20:49


   Last Admin: 01/07/18 20:06 Dose:  Not Given


Sodium Chloride (Normal Saline)  500 mls @ 500 mls/hr IV .BOLUS ONE


   Stop: 01/07/18 20:49


   Last Admin: 01/07/18 20:10 Dose:  Not Given


Sodium Chloride (Normal Saline)  250 mls @ 250 mls/hr IV .BOLUS ONE


   Stop: 01/07/18 20:56


   Last Admin: 01/07/18 20:28 Dose:  250 mls/hr


Iopamidol (Isovue-300 (61%))  100 ml IVPUSH ONETIME ONE


   Stop: 01/04/18 10:44


   Last Admin: 01/04/18 11:23 Dose:  100 ml


Ketorolac Tromethamine (Toradol)  15 mg IVPUSH Q8H [facility]


   Stop: 01/06/18 10:01


   Last Admin: 01/06/18 10:17 Dose:  15 mg


Ketorolac Tromethamine (Toradol)  30 mg IVPUSH ONETIME ONE


   Stop: 01/07/18 11:22


   Last Admin: 01/07/18 11:49 Dose:  30 mg


Ketorolac Tromethamine (Toradol)  15 mg IVPUSH Q6H [facility]


   Stop: 01/08/18 11:31


Ketorolac Tromethamine (Toradol)  15 mg IVPUSH Q6H [facility]


   Stop: 01/08/18 09:31


   Last Admin: 01/08/18 09:11 Dose:  15 mg


Magnesium Oxide (Magnesium Oxide)  400 mg PO DAILY [facility]


   Last Admin: 01/07/18 08:25 Dose:  400 mg


Methylprednisolone Sodium Succinate (Solu-Medrol)  125 mg IVPUSH ONETIME ONE


   Stop: 01/07/18 19:53


   Last Admin: 01/07/18 20:28 Dose:  125 mg


Metoprolol Succinate (Toprol Xl)  100 mg PO DAILY [facility]


   Last Admin: 01/05/18 09:49 Dose:  100 mg


Metoprolol Tartrate (Lopressor)  50 mg PO Q12HR [facility]


   Last Admin: 01/06/18 09:47 Dose:  Not Given


Metoprolol Tartrate (Lopressor)  50 mg PO ONETIME ONE


   Stop: 01/07/18 09:27


   Last Admin: 01/07/18 09:35 Dose:  Not Given


Miscellaneous Information (Remove Patch)  1 ea TRDERM Q72H [facility]


Miscellaneous Information (Remove Patch)  1 ea TRDERM Q72H [facility]


Miscellaneous Information (Remove Patch)  1 ea TRDERM NOW STA


   Stop: 01/07/18 19:54


   Last Admin: 01/07/18 20:41 Dose:  1 ea


Morphine Sulfate (Morphine)  2 mg IVPUSH ONETIME ONE


   Stop: 01/04/18 10:36


   Last Admin: 01/04/18 10:40 Dose:  2 mg


Morphine Sulfate (Morphine)  2 mg IVPUSH ONETIME ONE


   Stop: 01/04/18 16:49


   Last Admin: 01/04/18 18:05 Dose:  Not Given


Scopolamine (Scopolamine)  1 each TRDERM Q72H PRN


   PRN Reason: Nausea


Vit A/Vit C/Vit E/Selen/Cu/Zn/Lutei (Icaps Mv)  2 tab PO BID [facility]


   Last Admin: 01/06/18 21:22 Dose:  1 tab











- Exam


Quality Assessment: Supplemental Oxygen, DVT Prophylaxis


General: Alert, Oriented, Cooperative, No Acute Distress


HEENT: Pupils Equal, Pupils Reactive, EOMI, Mucous Membr. Moist/Pink


Neck: Supple, Trachea Midline, No JVD, No Thyromegaly


Lungs: Normal Respiratory Effort, Decreased Breath Sounds (left lower lobe)


Cardiovascular: Regular Rate, Regular Rhythm


GI/Abdominal Exam: Normal Bowel Sounds, Soft, Non-Tender, No Organomegaly, No 

Distention, No Abnormal Bruit, No Mass, Pelvis Stable


 (Female) Exam: Deferred


Back Exam: Normal Inspection, Full Range of Motion


Extremities: Normal Inspection, Normal Range of Motion, Non-Tender, No Pedal 

Edema, Normal Capillary Refill


Peripheral Pulses: 2+: Radial (L), Radial (R), Posterior Tibial (L), Posterior 

Tibial (R), Dorsalis Pedis (L), Dorsalis Pedis (R)


Skin: Warm, Dry, Intact


Neurological: No New Focal Deficit


Psy/Mental Status: Alert, Normal Affect, Normal Mood





- Problem List & Annotations


(1) Multiple fractures of ribs, left side, initial encounter for closed fracture


SNOMED Code(s): 56717761


   Code(s): S22.42XA - MULTIPLE FRACTURES OF RIBS, LEFT SIDE, INIT FOR CLOS FX 

  Status: Acute   Priority: High   Current Visit: Yes   





(2) Atrial fibrillation with RVR


SNOMED Code(s): 550792130272695


   Code(s): I48.91 - UNSPECIFIED ATRIAL FIBRILLATION   Status: Chronic   

Priority: Low   Current Visit: No   





(3) Chest pain


SNOMED Code(s): 11164606


   Code(s): R07.9 - CHEST PAIN, UNSPECIFIED   Status: Acute   Priority: Medium 

  Current Visit: No   


Qualifiers: 


   Chest pain type: unspecified   Qualified Code(s): R07.9 - Chest pain, 

unspecified   





(4) Hypertension


SNOMED Code(s): 72128729


   Code(s): I10 - ESSENTIAL (PRIMARY) HYPERTENSION   Status: Chronic   Priority

: Medium   Current Visit: No   


Qualifiers: 


   Hypertension type: essential hypertension   Qualified Code(s): I10 - 

Essential (primary) hypertension   





(5) Hyperkalemia


SNOMED Code(s): 48602985


   Code(s): E87.5 - HYPERKALEMIA   Status: Acute   Priority: Medium   Current 

Visit: Yes   





- Problem List Review


Problem List Initiated/Reviewed/Updated: Yes





- My Orders


Last 24 Hours: 


My Active Orders





01/09/18 05:11


BASIC METABOLIC PANEL,BMP [CHEM] AM 


CBC WITH AUTO DIFF [HEME] AM 


MAGNESIUM [CHEM] AM 














- Plan


Plan:: 


Impression:





S/P fall with multiple rib fractures


Recent increase in Metoprolol, dizziness when taken on an empty stomach - Will 

switch to metoprolol tartrate BID 


A Fib on coumadin without overt signs of hematoma


CT scan reports left sided hemothorax with compressive left lower lobe 

atelectasis


   -Hold warfarin 


   -Dr. Haynes consult


Hypekalemia


   -LR fluids





Chronic


CAD/PCI on Plavix


HTN


Macular Degeneration


Fibromyalgia


Hypothyroidism


Aortic Aneursym








Plan:





Gen Surg consult re: rib fx, pleural effusion, follow as needed for PTX


Pain mgt narcotic/non-narcotics; Toradol, change to Motrin in 24 hours;  will 

try Fentanyl patch- Start Dilaudid scheduled 


PT/OT consults


CSM consult


Home meds


Daily meds


DVT/GI prophylaxis


Adjustment of medical regimen to avoid hypotension


Change Lopressor with parameters


O2 as needed 


Considering SNF at [place], [facility].

## 2018-01-09 VITALS — DIASTOLIC BLOOD PRESSURE: 59 MMHG | SYSTOLIC BLOOD PRESSURE: 88 MMHG

## 2018-01-09 RX ADMIN — Medication SCH TAB: at 20:00

## 2018-01-09 RX ADMIN — Medication SCH TAB: at 09:25

## 2018-01-09 RX ADMIN — Medication SCH: at 09:26

## 2018-01-09 RX ADMIN — OXYCODONE HYDROCHLORIDE AND ACETAMINOPHEN PRN TAB: 5; 325 TABLET ORAL at 20:01

## 2018-01-09 RX ADMIN — HYDROCODONE BITARTRATE AND ACETAMINOPHEN PRN TAB: 5; 325 TABLET ORAL at 04:21

## 2018-01-09 RX ADMIN — OXYCODONE HYDROCHLORIDE AND ACETAMINOPHEN PRN TAB: 5; 325 TABLET ORAL at 09:25

## 2018-01-09 RX ADMIN — OXYCODONE HYDROCHLORIDE AND ACETAMINOPHEN PRN TAB: 5; 325 TABLET ORAL at 15:48

## 2018-01-09 RX ADMIN — SODIUM CHLORIDE PRN MG: 9 INJECTION, SOLUTION INTRAVENOUS at 01:38

## 2018-01-09 NOTE — OR
DATE OF OPERATION:  01/08/2018

 

SURGEON:  Percy Haynes MD

 

PREOPERATIVE DIAGNOSIS:

Left hemothorax.

 

POSTOPERATIVE DIAGNOSIS:

Left hemothorax.

 

OPERATION PERFORMED:  Placement of 40-Greek chest tube done under local

anesthetic 1% Xylocaine.

 

DESCRIPTION OF PROCEDURE:

The patient seen at the bedside and ultrasound was done showing the ideal place

to put the tube.  The patient's left lateral chest wall and anterior axillary

line was prepped with Betadine, draped off in a sterile fashion with the

patient's shoulder abducted.  Skin was anesthetized with 1% Xylocaine.  Incision

was made and carried down to the rib.  Additional anesthetic was instilled over

the rib in the anterior space of the intercostal space over the top of the rib.

This area was entered and a 40-Greek chest tube advanced and a large of amount

of bloody fluid was obtained.  It was then sutured to the skin with 0 silk

suture.  Sterile dressing was placed using Xeroform gauze and Vaseline gauze.

This was connected to a Hemovac and all connections were taped.  The patient

tolerated the procedure and a chest x-ray was obtained.

 

ANESTHESIA:

 

 

ESTIMATED BLOOD LOSS:

 

 

DD:  01/08/2018 18:51:21

DT:  01/08/2018 23:34:02  MMODAL

Job #:  [id number]

## 2018-01-09 NOTE — [id number]
- General Info


Date of Service: 01/09/18





- Patient Data


Vitals - Most Recent: 


 Last Vital Signs











Temp  98.1 F   01/09/18 04:00


 


Pulse  81   01/09/18 04:00


 


Resp  17   01/09/18 04:00


 


BP  130/55 L  01/09/18 04:00


 


Pulse Ox  93 L  01/09/18 04:00











Weight - Most Recent: 87.997 kg


I&O - Last 24 Hours: 


 Intake & Output











 01/08/18 01/08/18 01/09/18





 15:59 23:59 07:59


 


Intake Total 360 200 582


 


Output Total  1500 400


 


Balance 360 -1300 182











Lab Results Last 24 Hrs: 


 Laboratory Results - last 24 hr











  01/08/18 01/08/18 01/08/18 Range/Units





  06:05 06:05 06:05 


 


WBC  16.52 H    (3.98-10.04)  K/mm3


 


RBC  3.57 L    (3.98-5.22)  M/mm3


 


Hgb  11.1 L    (11.2-15.7)  gm/L


 


Hct  33.0 L    (34.1-44.9)  %


 


MCV  92.4    (79.4-94.8)  fl


 


MCH  31.1    (25.6-32.2)  pg


 


MCHC  33.6    (32.2-35.5)  g/dl


 


RDW Std Deviation  43.7    (36.4-46.3)  fL


 


Plt Count  257    (182-369)  K/mm3


 


MPV  9.8    (9.4-12.3)  fl


 


Neut % (Auto)  88.4 H    (34.0-71.1)  %


 


Lymph % (Auto)  8.5 L    (19.3-51.7)  %


 


Mono % (Auto)  2.6 L    (4.7-12.5)  %


 


Eos % (Auto)  0.1 L    (0.7-5.8)  


 


Baso % (Auto)  0.0 L    (0.1-1.2)  %


 


Neut # (Auto)  14.61 H    (1.56-6.13)  K/mm3


 


Lymph # (Auto)  1.41    (1.18-3.74)  K/mm3


 


Mono # (Auto)  0.43 H    (0.24-0.36)  K/mm3


 


Eos # (Auto)  0.01 L    (0.04-0.36)  K/mm3


 


Baso # (Auto)  0.00 L    (0.01-0.08)  K/mm3


 


Manual Slide Review  Abnormal smear    


 


PT   22.6 H   (8.0-13.0)  SECONDS


 


INR   1.98   


 


Sodium    137  (136-145)  mEq/L


 


Potassium    5.5 H  (3.5-5.1)  mEq/L


 


Chloride    102  ()  mEq/L


 


Carbon Dioxide    25  (21-32)  mEq/L


 


Anion Gap    15.5 H  (5-15)  


 


BUN    32 H  (7-18)  mg/dL


 


Creatinine    1.6 H  (0.55-1.02)  mg/dL


 


Est Cr Clr Drug Dosing    25.38  mL/min


 


Estimated GFR (MDRD)    31  (>60)  mL/min


 


BUN/Creatinine Ratio    20.0 H  (14-18)  


 


Glucose    136 H  ()  mg/dL


 


Calcium    8.8  (8.5-10.1)  mg/dL


 


Magnesium    2.4  (1.8-2.4)  mg/dl


 


Total Bilirubin    1.0  (0.2-1.0)  mg/dL


 


AST    34  (15-37)  U/L


 


ALT    49  (14-59)  U/L


 


Alkaline Phosphatase    83  ()  U/L


 


Troponin I     (0.00-0.056)  ng/mL


 


Total Protein    6.8  (6.4-8.2)  g/dl


 


Albumin    3.1 L  (3.4-5.0)  g/dl


 


Globulin    3.7  gm/dL


 


Albumin/Globulin Ratio    0.8 L  (1-2)  


 


Lipase    131  ()  U/L


 


Blood Type     


 


Gel Antibody Screen     














  01/08/18 01/08/18 01/09/18 Range/Units





  06:05 20:54 01:05 


 


WBC     (3.98-10.04)  K/mm3


 


RBC     (3.98-5.22)  M/mm3


 


Hgb   10.4 L   (11.2-15.7)  gm/L


 


Hct   31.2 L   (34.1-44.9)  %


 


MCV     (79.4-94.8)  fl


 


MCH     (25.6-32.2)  pg


 


MCHC     (32.2-35.5)  g/dl


 


RDW Std Deviation     (36.4-46.3)  fL


 


Plt Count     (182-369)  K/mm3


 


MPV     (9.4-12.3)  fl


 


Neut % (Auto)     (34.0-71.1)  %


 


Lymph % (Auto)     (19.3-51.7)  %


 


Mono % (Auto)     (4.7-12.5)  %


 


Eos % (Auto)     (0.7-5.8)  


 


Baso % (Auto)     (0.1-1.2)  %


 


Neut # (Auto)     (1.56-6.13)  K/mm3


 


Lymph # (Auto)     (1.18-3.74)  K/mm3


 


Mono # (Auto)     (0.24-0.36)  K/mm3


 


Eos # (Auto)     (0.04-0.36)  K/mm3


 


Baso # (Auto)     (0.01-0.08)  K/mm3


 


Manual Slide Review     


 


PT     (8.0-13.0)  SECONDS


 


INR     


 


Sodium     (136-145)  mEq/L


 


Potassium     (3.5-5.1)  mEq/L


 


Chloride     ()  mEq/L


 


Carbon Dioxide     (21-32)  mEq/L


 


Anion Gap     (5-15)  


 


BUN     (7-18)  mg/dL


 


Creatinine     (0.55-1.02)  mg/dL


 


Est Cr Clr Drug Dosing     mL/min


 


Estimated GFR (MDRD)     (>60)  mL/min


 


BUN/Creatinine Ratio     (14-18)  


 


Glucose     ()  mg/dL


 


Calcium     (8.5-10.1)  mg/dL


 


Magnesium     (1.8-2.4)  mg/dl


 


Total Bilirubin     (0.2-1.0)  mg/dL


 


AST     (15-37)  U/L


 


ALT     (14-59)  U/L


 


Alkaline Phosphatase     ()  U/L


 


Troponin I    0.053  (0.00-0.056)  ng/mL


 


Total Protein     (6.4-8.2)  g/dl


 


Albumin     (3.4-5.0)  g/dl


 


Globulin     gm/dL


 


Albumin/Globulin Ratio     (1-2)  


 


Lipase     ()  U/L


 


Blood Type  O NEGATIVE    


 


Gel Antibody Screen  Negative    











Med Orders - Current: 


 Current Medications





Hydrocodone Bitart/Acetaminophen (Norco 325-5 Mg)  1 tab PO Q4H PRN


   PRN Reason: Pain


   Last Admin: 01/09/18 04:21 Dose:  1 tab


Amitriptyline HCl (Elavil)  25 mg PO BEDTIME [facility]


   Last Admin: 01/08/18 21:15 Dose:  25 mg


Clopidogrel Bisulfate (Plavix)  75 mg PO DAILY [facility]


   Last Admin: 01/08/18 09:11 Dose:  75 mg


Docusate Sodium (Colace)  100 mg PO BID [facility]


   Last Admin: 01/08/18 21:16 Dose:  100 mg


Hydromorphone HCl (Dilaudid)  0.5 mg IVPUSH Q4H PRN


   PRN Reason: Pain


   Last Admin: 01/09/18 05:50 Dose:  0.5 mg


Levothyroxine Sodium (Synthroid)  88 mcg PO ACBREAKFAST [facility]


   Last Admin: 01/09/18 05:54 Dose:  88 mcg


Magnesium Oxide (Magnesium Oxide)  400 mg PO BID [facility]


   Last Admin: 01/08/18 21:16 Dose:  400 mg


Metoprolol Tartrate (Lopressor)  50 mg PO BID@1200,2100 [facility]


   Last Admin: 01/08/18 22:50 Dose:  Not Given


Ondansetron HCl (Zofran)  4 mg IVPUSH Q8H PRN


   PRN Reason: Nausea/Vomiting


   Last Admin: 01/09/18 01:38 Dose:  4 mg


Ptom: Ubidecarenone (100mg (Coenzyme-Q))  1 each PO DAILY [facility]


   Last Admin: 01/08/18 09:17 Dose:  Not Given


Polyethylene Glycol (Miralax)  17 gm PO BEDTIME PRN


   PRN Reason: Constipation


   Last Admin: 01/07/18 08:26 Dose:  17 gm


Simvastatin (Zocor)  10 mg PO BEDTIME [facility]


   Last Admin: 01/08/18 21:16 Dose:  10 mg


Sodium Chloride (Saline Flush)  10 ml FLUSH ONETIME PRN


   PRN Reason: IV FLUSH


   Last Admin: 01/05/18 10:03 Dose:  10 ml


Vit A/Vit C/Vit E/Selen/Cu/Zn/Lutei (Icaps Mv)  1 tab PO BID [facility]


   Last Admin: 01/08/18 21:16 Dose:  1 tab


Warfarin Sodium (Coumadin)  7.5 mg PO MoWeFr@1800 [facility]


   Last Admin: 01/05/18 17:37 Dose:  7.5 mg


Warfarin Sodium (Coumadin)  5 mg PO SuTuThSa@1800 [facility]


   Last Admin: 01/07/18 17:00 Dose:  5 mg





Discontinued Medications





Hydrocodone Bitart/Acetaminophen (Norco 325-5 Mg)  1 tab PO ONETIME ONE


   Stop: 01/08/18 23:59


   Last Admin: 01/09/18 00:07 Dose:  1 tab


Bisacodyl (Dulcolax)  10 mg RECTAL ONETIME ONE


   Stop: 01/07/18 21:33


   Last Admin: 01/08/18 00:27 Dose:  Not Given


Bisacodyl (Dulcolax)  10 mg RECTAL ONETIME ONE


   Stop: 01/07/18 21:53


   Last Admin: 01/07/18 22:03 Dose:  10 mg


Enoxaparin Sodium (Lovenox)  40 mg SUBCUT DAILY [facility]


   Last Admin: 01/05/18 10:01 Dose:  Not Given


Fentanyl (Duragesic)  12 mcg TRDERM Q72H [facility]


   Last Admin: 01/04/18 18:47 Dose:  12 mcg


Fentanyl (Duragesic)  12 mcg TRDERM Q72H [facility]


   Last Admin: 01/07/18 11:50 Dose:  12 mcg


Hydromorphone HCl (Dilaudid)  1 mg IVPUSH ONETIME ONE


   Stop: 01/04/18 11:07


   Last Admin: 01/04/18 11:10 Dose:  1 mg


Hydromorphone HCl (Dilaudid) Confirm Administered Dose 1 mg .ROUTE .STK-MED ONE


   Stop: 01/04/18 11:13


   Last Admin: 01/04/18 11:16 Dose:  Not Given


Hydromorphone HCl (Dilaudid)  1 mg IVPUSH Q6H PRN


   PRN Reason: Pain (severe 7-10)


   Last Admin: 01/04/18 18:19 Dose:  1 mg


Hydromorphone HCl (Dilaudid)  1 mg IVPUSH Q2H PRN


   PRN Reason: Pain


   Last Admin: 01/07/18 07:54 Dose:  1 mg


Sodium Chloride (Normal Saline)  500 mls @ 250 mls/hr IV .BOLUS ONE


   Stop: 01/04/18 12:35


   Last Admin: 01/04/18 10:42 Dose:  250 mls/hr


Sodium Chloride (Normal Saline) Confirm Administered Dose 1,000 mls @ as 

directed .ROUTE .STK-MED ONE


   Stop: 01/04/18 10:45


   Last Admin: 01/04/18 10:43 Dose:  Not Given


Lactated Ringer's (Ringers, Lactated)  1,000 mls @ 75 mls/hr IV ASDIRECTED [facility]


   Stop: 01/05/18 06:00


   Last Admin: 01/04/18 20:00 Dose:  75 mls/hr


Sodium Chloride (Normal Saline)  250 mls @ 250 mls/hr IV .BOLUS ONE


   Stop: 01/07/18 20:49


   Last Admin: 01/07/18 20:06 Dose:  Not Given


Sodium Chloride (Normal Saline)  500 mls @ 500 mls/hr IV .BOLUS ONE


   Stop: 01/07/18 20:49


   Last Admin: 01/07/18 20:10 Dose:  Not Given


Sodium Chloride (Normal Saline)  250 mls @ 250 mls/hr IV .BOLUS ONE


   Stop: 01/07/18 20:56


   Last Admin: 01/07/18 20:28 Dose:  250 mls/hr


Lactated Ringer's (Ringers, Lactated)  1,000 mls @ 100 mls/hr IV ASDIRECTED [facility]


   Stop: 01/08/18 22:00


   Last Admin: 01/08/18 16:27 Dose:  100 mls/hr


Iopamidol (Isovue-300 (61%))  100 ml IVPUSH ONETIME ONE


   Stop: 01/04/18 10:44


   Last Admin: 01/04/18 11:23 Dose:  100 ml


Ketorolac Tromethamine (Toradol)  15 mg IVPUSH Q8H [facility]


   Stop: 01/06/18 10:01


   Last Admin: 01/06/18 10:17 Dose:  15 mg


Ketorolac Tromethamine (Toradol)  30 mg IVPUSH ONETIME ONE


   Stop: 01/07/18 11:22


   Last Admin: 01/07/18 11:49 Dose:  30 mg


Ketorolac Tromethamine (Toradol)  15 mg IVPUSH Q6H [facility]


   Stop: 01/08/18 11:31


Ketorolac Tromethamine (Toradol)  15 mg IVPUSH Q6H [facility]


   Stop: 01/08/18 09:31


   Last Admin: 01/08/18 09:11 Dose:  15 mg


Magnesium Oxide (Magnesium Oxide)  400 mg PO DAILY [facility]


   Last Admin: 01/07/18 08:25 Dose:  400 mg


Methylprednisolone Sodium Succinate (Solu-Medrol)  125 mg IVPUSH ONETIME ONE


   Stop: 01/07/18 19:53


   Last Admin: 01/07/18 20:28 Dose:  125 mg


Metoprolol Succinate (Toprol Xl)  100 mg PO DAILY [facility]


   Last Admin: 01/05/18 09:49 Dose:  100 mg


Metoprolol Tartrate (Lopressor)  50 mg PO Q12HR [facility]


   Last Admin: 01/06/18 09:47 Dose:  Not Given


Metoprolol Tartrate (Lopressor)  50 mg PO ONETIME ONE


   Stop: 01/07/18 09:27


   Last Admin: 01/07/18 09:35 Dose:  Not Given


Miscellaneous Information (Remove Patch)  1 ea TRDERM Q72H [facility]


Miscellaneous Information (Remove Patch)  1 ea TRDERM Q72H [facility]


Miscellaneous Information (Remove Patch)  1 ea TRDERM NOW STA


   Stop: 01/07/18 19:54


   Last Admin: 01/07/18 20:41 Dose:  1 ea


Morphine Sulfate (Morphine)  2 mg IVPUSH ONETIME ONE


   Stop: 01/04/18 10:36


   Last Admin: 01/04/18 10:40 Dose:  2 mg


Morphine Sulfate (Morphine)  2 mg IVPUSH ONETIME ONE


   Stop: 01/04/18 16:49


   Last Admin: 01/04/18 18:05 Dose:  Not Given


Phytonadione (Aquamephyton)  5 mg PO ONETIME ONE


   Stop: 01/08/18 19:59


   Last Admin: 01/08/18 21:16 Dose:  5 mg


Scopolamine (Scopolamine)  1 each TRDERM Q72H PRN


   PRN Reason: Nausea


Vit A/Vit C/Vit E/Selen/Cu/Zn/Lutei (Icaps Mv)  2 tab PO BID [facility]


   Last Admin: 01/06/18 21:22 Dose:  1 tab











- Problem List Review


Problem List Initiated/Reviewed/Updated: Yes





- My Orders


Last 24 Hours: 


 Active Orders 24 hr











 Category Date Time Status


 


 Patient Status [ADT] Routine ADT  01/08/18 19:49 Active


 


 Chest Tube Management [RC] Q4HR Care  01/08/18 19:13 Active


 


 Enema [RC] ASDIRECTED Care  01/08/18 09:02 Active


 


 Notify Provider Consults [RC] ASDIRECTED Care  01/08/18 09:42 Active


 


 Consult to Case Management [CONS] Routine Cons  01/08/18 20:01 Active


 


 Consult to Physician [CONS] Routine Cons  01/08/18 09:41 Active


 


 Consult to  [CONS] Routine Cons  01/08/18 20:01 Active


 


 Consult to Spiritual Care [CONS] Routine Cons  01/08/18 20:01 Active


 


 Clear Liquid Diet [DIET] Diet  01/08/18 Breakfast Active


 


 Soft Diet [DIET] Diet  01/09/18 Breakfast Ordered


 


 CXR [Chest 1V Frontal] [CR] Routine Exams  01/09/18 09:00 Ordered


 


 Chest [US] Routine Exams  01/08/18 17:35 Ordered


 


 BASIC METABOLIC PANEL,BMP [CHEM] AM Lab  01/09/18 05:11 Ordered


 


 CBC WITH AUTO DIFF [HEME] AM Lab  01/09/18 05:11 Ordered


 


 INR,PT,PROTHROMBIN TIME [COAG] Routine Lab  01/09/18 05:11 Ordered


 


 MAGNESIUM [CHEM] AM Lab  01/09/18 05:11 Ordered


 


 Acetaminophen/oxyCODONE [Percocet 325-5 MG] Med  01/09/18 06:45 Ordered





 1 tab PO Q4H PRN   








 Medication Orders





Hydrocodone Bitart/Acetaminophen (Norco 325-5 Mg)  1 tab PO Q4H PRN


   PRN Reason: Pain


   Last Admin: 01/09/18 04:21  Dose: 1 tab


   Admin: 01/08/18 21:23  Dose: 1 tab


   Admin: 01/08/18 16:26  Dose: 1 tab


   Admin: 01/08/18 10:32  Dose: 1 tab


   Admin: 01/08/18 06:26  Dose: 1 tab


   Admin: 01/08/18 02:29  Dose: 1 tab


   Admin: 01/07/18 21:45  Dose: 1 tab


   Admin: 01/07/18 05:58  Dose: 1 tab


   Admin: 01/07/18 02:18  Dose: 1 tab


   Admin: 01/06/18 21:24  Dose: 1 tab


   Admin: 01/06/18 17:31  Dose: 1 tab


   Admin: 01/06/18 12:00  Dose: 1 tab


   Admin: 01/06/18 07:08  Dose: 1 tab


   Admin: 01/06/18 03:16  Dose: 1 tab


   Admin: 01/05/18 21:45  Dose: 1 tab


   Admin: 01/05/18 06:30  Dose: 1 tab


   Admin: 01/05/18 03:23  Dose: 1 tab


   Admin: 01/04/18 21:25  Dose: 1 tab


   Admin: 01/04/18 17:17  Dose: 1 tab


Amitriptyline HCl (Elavil)  25 mg PO BEDTIME [facility]


   Last Admin: 01/08/18 21:15  Dose: 25 mg


   Admin: 01/07/18 20:30  Dose: 25 mg


   Admin: 01/06/18 21:23  Dose: 25 mg


   Admin: 01/05/18 21:47  Dose: 25 mg


   Admin: 01/04/18 21:25  Dose: 25 mg


Clopidogrel Bisulfate (Plavix)  75 mg PO DAILY [facility]


   Last Admin: 01/08/18 09:11  Dose: 75 mg


   Admin: 01/07/18 08:25  Dose: 75 mg


   Admin: 01/06/18 09:09  Dose: 75 mg


   Admin: 01/05/18 09:44  Dose: 75 mg


Docusate Sodium (Colace)  100 mg PO BID [facility]


   Last Admin: 01/08/18 21:16  Dose: 100 mg


   Admin: 01/08/18 09:11  Dose: 100 mg


   Admin: 01/07/18 20:30  Dose: 100 mg


   Admin: 01/07/18 08:25  Dose: 100 mg


   Admin: 01/06/18 21:22  Dose: 100 mg


   Admin: 01/06/18 09:09  Dose: 100 mg


   Admin: 01/05/18 21:47  Dose: 100 mg


Hydromorphone HCl (Dilaudid)  0.5 mg IVPUSH Q4H PRN


   PRN Reason: Pain


   Last Admin: 01/09/18 05:50  Dose: 0.5 mg


   Admin: 01/09/18 01:48  Dose: 0.5 mg


   Admin: 01/08/18 21:48  Dose: 0.5 mg


   Admin: 01/08/18 17:35  Dose: 0.5 mg


Levothyroxine Sodium (Synthroid)  88 mcg PO ACBREAKFAST [facility]


   Last Admin: 01/09/18 05:54  Dose: 88 mcg


   Admin: 01/08/18 06:26  Dose: 88 mcg


   Admin: 01/07/18 05:58  Dose: 88 mcg


   Admin: 01/06/18 06:19  Dose: 88 mcg


   Admin: 01/05/18 06:30  Dose: 88 mcg


Magnesium Oxide (Magnesium Oxide)  400 mg PO BID [facility]


   Last Admin: 01/08/18 21:16  Dose: 400 mg


   Admin: 01/08/18 09:11  Dose: 400 mg


   Admin: 01/07/18 20:30  Dose: 400 mg


Metoprolol Tartrate (Lopressor)  50 mg PO BID@1200,2100 [facility]


   Last Admin: 01/08/18 22:50  Dose:  


   Admin: 01/08/18 13:10  Dose: 50 mg


   Admin: 01/07/18 20:30  Dose: 50 mg


   Admin: 01/07/18 12:17  Dose:  


   Admin: 01/07/18 09:33  Dose: 50 mg


   Admin: 01/06/18 21:23  Dose:  


   Admin: 01/06/18 12:25 Dose:  Not Given


Ondansetron HCl (Zofran)  4 mg IVPUSH Q8H PRN


   PRN Reason: Nausea/Vomiting


   Last Admin: 01/09/18 01:38  Dose: 4 mg


   Admin: 01/07/18 13:13  Dose: 4 mg


   Admin: 01/06/18 07:14  Dose: 4 mg


   Admin: 01/04/18 18:38  Dose: 4 mg


Ptom: Ubidecarenone (100mg (Coenzyme-Q))  1 each PO DAILY [facility]


   Last Admin: 01/08/18 09:17  Dose:  


   Admin: 01/07/18 09:17  Dose:  


   Admin: 01/06/18 14:47  Dose: 1 each


   Admin: 01/05/18 09:51  Dose: 1 each


Polyethylene Glycol (Miralax)  17 gm PO BEDTIME PRN


   PRN Reason: Constipation


   Last Admin: 01/07/18 08:26  Dose: 17 gm


Simvastatin (Zocor)  10 mg PO BEDTIME [facility]


   Last Admin: 01/08/18 21:16  Dose: 10 mg


   Admin: 01/07/18 20:30  Dose: 10 mg


   Admin: 01/06/18 21:23  Dose: 10 mg


   Admin: 01/05/18 21:48  Dose: 10 mg


Sodium Chloride (Saline Flush)  10 ml FLUSH ONETIME PRN


   PRN Reason: IV FLUSH


   Last Admin: 01/05/18 10:03  Dose: 10 ml


   Admin: 01/04/18 11:23  Dose: 10 ml


   Admin: 01/04/18 10:59  Dose: 10 ml


Vit A/Vit C/Vit E/Selen/Cu/Zn/Lutei (Icaps Mv)  1 tab PO BID [facility]


   Last Admin: 01/08/18 21:16  Dose: 1 tab


   Admin: 01/08/18 09:11  Dose: 1 tab


   Admin: 01/07/18 20:30  Dose: 1 tab


   Admin: 01/07/18 08:25  Dose: 1 tab


Warfarin Sodium (Coumadin)  7.5 mg PO MoWeFr@1800 [facility]


   Last Admin: 01/05/18 17:37  Dose: 7.5 mg


Warfarin Sodium (Coumadin)  5 mg PO SuTuThSa@1800 [facility]


   Last Admin: 01/07/18 17:00  Dose: 5 mg


   Admin: 01/06/18 17:32  Dose: 5 mg


   Admin: 01/04/18 18:39  Dose: 5 mg











- Plan


Plan                        (Free Text/Narrative):: 





chest tube is working no futher blood from the tube clear fluid out about 300 

cc 


no air leak 


hb 10 


compalintof pain vicodin not working 


plan start oxydoone

## 2018-01-09 NOTE — [id number].DCSUM1
**Discharge Summary





- Hospital Course


HPI Initial Comments: 


82 year old female s/p fall, hitting furniture with her back and left side.  

She felt dizzy after taking a hand full of her cardiac medication on an empty 

stomach.  She has had a radiographic study that documents multiple left sided 

rib fractures.  She is on coumadin as well as plavix for coronary stents.  The 

patient stated that her metoprolol had been recently increased.  She has had no 

N/V, SOB, CP, orthopnea, PND, syncopal/presyncopal episodes.  The patient will 

be admitted to obs with telemetry.








- Discharge Data


Discharge Date: 01/09/18 (Admit Date: 01/05/18)


Discharge Disposition: DC/Tfer to Acute Hospital 02


Condition: Fair





- Discharge Diagnosis/Problem(s)


(1) Multiple fractures of ribs, left side, initial encounter for closed fracture


SNOMED Code(s): 93137761


   ICD Code: S22.42XA - MULTIPLE FRACTURES OF RIBS, LEFT SIDE, INIT FOR CLOS FX

   Status: Acute   Priority: High   Current Visit: Yes   





(2) Atrial fibrillation with RVR


SNOMED Code(s): 304219920298859


   ICD Code: I48.91 - UNSPECIFIED ATRIAL FIBRILLATION   Status: Acute   Priority

: High   Current Visit: Yes   





(3) Chest pain


SNOMED Code(s): 71467377


   ICD Code: R07.9 - CHEST PAIN, UNSPECIFIED   Status: Acute   Priority: High   

Current Visit: Yes   


Qualifiers: 


   Chest pain type: unspecified   Qualified Code(s): R07.9 - Chest pain, 

unspecified   





(4) Hypertension


SNOMED Code(s): 35415614


   ICD Code: I10 - ESSENTIAL (PRIMARY) HYPERTENSION   Status: Chronic   Priority

: Medium   Current Visit: No   


Qualifiers: 


   Hypertension type: essential hypertension   Qualified Code(s): I10 - 

Essential (primary) hypertension   





(5) Hyperkalemia


SNOMED Code(s): 95491695


   ICD Code: E87.5 - HYPERKALEMIA   Status: Acute   Priority: Medium   Current 

Visit: Yes   





(6) Hemothorax on left


SNOMED Code(s): 61254776


   ICD Code: J94.2 - HEMOTHORAX   Status: Acute   Priority: High   Current Visit

: Yes   





- Patient Summary/Data


Operative Procedure(s) Performed: place chest tube 40F - approximately 1000mL 

bloody output


Consults: 


 Consultations





01/08/18 09:41


Consult to Physician [CONS] Routine 





01/08/18 20:01


Consult to Case Management [CONS] Routine 


Consult to  [CONS] Routine 


Consult to Spiritual Care [CONS] Routine 





01/09/18 09:33


Consult to Occupational Therapy [OT Evaluation and Treatment] [CONS] Routine 


Consult to Physical Therapy [PT Evaluation and Treatment] [CONS] Routine 











Hospital Course: 


Patient was initially doing quiet well after admission. She was upgraded to 

full inpatient status. Over the past weekend she began to complain of 

increasing chest pain and abdominal pain. Therapy noticed that she needed 

increased assistance and was having a difficult day. Troponin and EKG were 

negative. Abdominal x-ray did show a suspected illius and general surgery was 

consulted. Lipase was slightly elevated. Diet was changed to clear liquids. (

This was advanced to soft diet later in stay.) CXR showed a newly developed 

left sided pleural effusion. CT was obtained and did show a probable 

hemothorax. INR was noted to be 1.98. Vitamin K 5mg was given. General surgery (

Dr. Haynes) was again consulted. He contacted Dr. Rangel in [place] who 

reported that they were without beds and suggested Dr. Haynes place a chest 

tube and monitor. Chest tube was placed utilizing a 40f and secured. During 

insertion approximately 1L of bloody fluid was drained from her chest. 500ml 

was noted in the tube shortly after. Placement was confirmed on CXR and did 

show a 30% pneumothorax. (This improved over her stay with us.) She was also 

upgraded to ICU status. She later converted into atrial fib with RVR. Cardazem 

drip was started with minimal effect. She was switched to esmolol with very 

minimal noticable effect as well. She does have a hx/o A-fib and per her 

reports she was discussing an appointment at [place] with her PCP for ablation. 

Her BP remained in the upper 90's systolic. She has frequently reported severe 

pain. Multiple pain medications were given including percocet, norco, dilaudid 

and also ativan for anxiety. She has a lidocaine patch in place. A fentanyl 

patch was attempted earlier in her stay, however this resulted in hypotension 

and was discontinued. We have had difficulty obtaining pain control and 

difficulty getting her A-fib to respond. Dr. Haynes contacted Dr. Talon Jara at [facility] in [place] and he agrees to the transfer. The patient is to 

come through the ED. Ambulance is contacted and transfer is arranged. She will 

be transfered with a cardazem drip and NS. She was given a saline bolus and 

digoxin prior to transport. Esmolol was stopped. Her warfran has been held 

since the hemothorax was noted. She is still on her home dosage of plavix. 





- Patient Instructions


Diet: Heart Healthy Diet


Driving: Do Not Drive





- Discharge Plan


Home Medications: 


 Home Meds





Alpha Lipoic Acid [Alpha-Lipoic Acid] 200 mg PO DAILY 06/08/15 [History]


Amitriptyline [Elavil] 25 mg PO BEDTIME 06/08/15 [History]


Cinnamon Bark [Cinnamon] 500 mg PO DAILY 06/08/15 [History]


Fish Oil/Omega-3 Fatty Acids [Fish Oil] 1,200 mg PO DAILY 06/08/15 [History]


Metoprolol Succinate [Toprol XL] 100 mg PO DAILY 06/08/15 [History]


Ubidecarenone [Co Q-10] 100 mg PO DAILY 06/08/15 [History]


Clopidogrel [Plavix] 75 mg PO DAILY 12/25/17 [History]


Levothyroxine [Synthroid] 88 mcg PO DAILY 12/25/17 [History]


Magnesium 250 mg PO DAILY 12/25/17 [History]


Multivit-Min/FA/Lycopene/Lut [Centrum Silver Tablet] 1 tab PO DAILY 12/25/17 [

History]


[employer]  1 tab PO DAILY 12/25/17 [History]


Vit A/Vit C/Vit E/Zinc/Copper [Icaps Areds Formula] 1 tab PO BID 12/25/17 [

History]


Warfarin [Coumadin] 5 mg PO SUTUTHSA 12/25/17 [History]


Warfarin [Coumadin] 7.5 mg PO MOWEFR 12/25/17 [History]


atorvaSTATin [Lipitor] 10 mg PO DAILY 12/25/17 [History]








Forms:  ED Department Discharge


Referrals: 


Ryne Arvizu MD [Primary Care Provider] - 





- Discharge Summary/Plan Comment


DC Time >30 min.: Yes (40 minutes )





- General Info


Date of Service: 01/09/18


Admission Dx/Problem (Free Text: 


Multiple rib fractures 





Subjective Update: 


In to see Zuri. She is very uncomfortable and reports severe pain. She 

states "I am dying." Both myself and Dr. Fried assure her she is doing as good 

as to be expected. She had chest pain last night that radiated into her 

shoulder. Troponin and 12-lead EKG were ordered and both were negative. She has 

been utilizing IS/Acapella. She did convert to A-fib with RVR today. BP has 

been slightly low and Hgb has been trending downward. Both her  and her 

as multiple questions and both myself and Dr. Fried attempt to explain to them 

her progress thus far, prognosis, and plan of care. Ultimately Dr. Haynes is 

contacted and arranges transfer to [place]. 





Functional Status: Reports: Tolerating Diet, Ambulating, Urinating.  Denies: 

Pain Controlled





- Review of Systems


General: Reports: Weakness, Fatigue, Malaise.  Denies: Fever, Night Sweats


HEENT: Denies: Ear Pain, Eye Pain, Glasses, Headaches, Sore Throat


Pulmonary: Reports: Shortness of Breath, Pleuritic Chest Pain.  Denies: Cough, 

Sputum


Cardiovascular: Reports: Chest Pain, Palpitations, Dyspnea on Exertion, Edema


Gastrointestinal: Denies: Abdominal Pain, Constipation, Diarrhea, Nausea, 

Vomiting


Genitourinary: Reports: No Symptoms.  Denies: Dysuria, Frequency, Burning, Pain

, Urgency


Musculoskeletal: Reports: Shoulder Pain (left ), Back Pain, Other (Chest pain)


Skin: Reports: No Symptoms


Neurological: Reports: Weakness


Psychiatric: Reports: Anxiety





- Patient Data


Vitals - Most Recent: 


 Last Vital Signs











Temp  98.2 F   01/09/18 21:00


 


Pulse  127 H  01/09/18 21:00


 


Resp  15   01/09/18 21:00


 


BP  94/59 L  01/09/18 21:00


 


Pulse Ox  97   01/09/18 21:00











Weight - Most Recent: 194 lb


I&O - Last 24 hours: 


 Intake & Output











 01/09/18 01/09/18 01/09/18





 06:59 14:59 22:59


 


Intake Total 582 100 280


 


Output Total 700 950 600


 


Balance -118 -850 -320











Lab Results - Last 24 hrs: 


 Laboratory Results - last 24 hr











  01/09/18 01/09/18 01/09/18 Range/Units





  01:05 05:46 05:46 


 


WBC   15.65 H   (3.98-10.04)  K/mm3


 


RBC   3.20 L   (3.98-5.22)  M/mm3


 


Hgb   10.0 L   (11.2-15.7)  gm/L


 


Hct   30.1 L   (34.1-44.9)  %


 


MCV   94.1   (79.4-94.8)  fl


 


MCH   31.3   (25.6-32.2)  pg


 


MCHC   33.2   (32.2-35.5)  g/dl


 


RDW Std Deviation   45.5   (36.4-46.3)  fL


 


Plt Count   266   (182-369)  K/mm3


 


MPV   9.5   (9.4-12.3)  fl


 


Neut % (Auto)   71.0   (34.0-71.1)  %


 


Lymph % (Auto)   13.8 L   (19.3-51.7)  %


 


Mono % (Auto)   14.4 H   (4.7-12.5)  %


 


Eos % (Auto)   0.3 L   (0.7-5.8)  


 


Baso % (Auto)   0.1   (0.1-1.2)  %


 


Neut # (Auto)   11.11 H   (1.56-6.13)  K/mm3


 


Lymph # (Auto)   2.16   (1.18-3.74)  K/mm3


 


Mono # (Auto)   2.26 H   (0.24-0.36)  K/mm3


 


Eos # (Auto)   0.04   (0.04-0.36)  K/mm3


 


Baso # (Auto)   0.02   (0.01-0.08)  K/mm3


 


Manual Slide Review   Abnormal smear   


 


PT     (8.0-13.0)  SECONDS


 


INR     


 


Sodium    137  (136-145)  mEq/L


 


Potassium    4.5  (3.5-5.1)  mEq/L


 


Chloride    101  ()  mEq/L


 


Carbon Dioxide    28  (21-32)  mEq/L


 


Anion Gap    12.5  (5-15)  


 


BUN    31 H  (7-18)  mg/dL


 


Creatinine    1.1 H  (0.55-1.02)  mg/dL


 


Est Cr Clr Drug Dosing    36.91  mL/min


 


Estimated GFR (MDRD)    48  (>60)  mL/min


 


BUN/Creatinine Ratio    28.2 H  (14-18)  


 


Glucose    114  ()  mg/dL


 


Calcium    8.4 L  (8.5-10.1)  mg/dL


 


Magnesium    2.2  (1.8-2.4)  mg/dl


 


Troponin I  0.053    (0.00-0.056)  ng/mL














  01/09/18 01/09/18 01/09/18 Range/Units





  05:46 12:37 15:55 


 


WBC    14.38 H  (3.98-10.04)  K/mm3


 


RBC    3.16 L  (3.98-5.22)  M/mm3


 


Hgb   10.8 L  9.9 L  (11.2-15.7)  gm/L


 


Hct    29.9 L  (34.1-44.9)  %


 


MCV    94.6  (79.4-94.8)  fl


 


MCH    31.3  (25.6-32.2)  pg


 


MCHC    33.1  (32.2-35.5)  g/dl


 


RDW Std Deviation    45.4  (36.4-46.3)  fL


 


Plt Count    302  (182-369)  K/mm3


 


MPV    9.2 L  (9.4-12.3)  fl


 


Neut % (Auto)    67.0  (34.0-71.1)  %


 


Lymph % (Auto)    16.5 L  (19.3-51.7)  %


 


Mono % (Auto)    14.7 H  (4.7-12.5)  %


 


Eos % (Auto)    1.0  (0.7-5.8)  


 


Baso % (Auto)    0.2  (0.1-1.2)  %


 


Neut # (Auto)    9.64 H  (1.56-6.13)  K/mm3


 


Lymph # (Auto)    2.37  (1.18-3.74)  K/mm3


 


Mono # (Auto)    2.11 H  (0.24-0.36)  K/mm3


 


Eos # (Auto)    0.15  (0.04-0.36)  K/mm3


 


Baso # (Auto)    0.03  (0.01-0.08)  K/mm3


 


Manual Slide Review    Abnormal smear  


 


PT  16.1 H    (8.0-13.0)  SECONDS


 


INR  1.44    


 


Sodium     (136-145)  mEq/L


 


Potassium     (3.5-5.1)  mEq/L


 


Chloride     ()  mEq/L


 


Carbon Dioxide     (21-32)  mEq/L


 


Anion Gap     (5-15)  


 


BUN     (7-18)  mg/dL


 


Creatinine     (0.55-1.02)  mg/dL


 


Est Cr Clr Drug Dosing     mL/min


 


Estimated GFR (MDRD)     (>60)  mL/min


 


BUN/Creatinine Ratio     (14-18)  


 


Glucose     ()  mg/dL


 


Calcium     (8.5-10.1)  mg/dL


 


Magnesium     (1.8-2.4)  mg/dl


 


Troponin I     (0.00-0.056)  ng/mL











Med Orders - Current: 


 Current Medications





Hydrocodone Bitart/Acetaminophen (Norco 325-5 Mg)  1 tab PO Q4H PRN


   PRN Reason: Pain


   Last Admin: 01/09/18 04:21 Dose:  1 tab


Amitriptyline HCl (Elavil)  25 mg PO BEDTIME [facility]


   Last Admin: 01/09/18 20:00 Dose:  25 mg


Clopidogrel Bisulfate (Plavix)  75 mg PO DAILY [facility]


   Last Admin: 01/09/18 09:26 Dose:  75 mg


Docusate Sodium (Colace)  100 mg PO BID [facility]


   Last Admin: 01/09/18 20:01 Dose:  100 mg


Hydromorphone HCl (Dilaudid)  0.5 mg IVPUSH Q4H PRN


   PRN Reason: Pain


   Last Admin: 01/09/18 10:27 Dose:  0.5 mg


Diltiazem HCl 125 mg/ Sodium (Chloride)  125 mls @ 5 mls/hr IV TITRATE ROSHNI; 5 MG

/HR


   PRN Reason: Protocol


   Last Titration: 01/09/18 18:23 Dose:  0 mg/hr, 0 mls/hr


Sodium Chloride (Normal Saline)  1,000 mls @ 100 mls/hr IV ASDIRECTED ROS[facility]NI


   Last Admin: 01/09/18 18:10 Dose:  100 mls/hr


Esmolol HCl (Brevibloc In Ns Premix)  2.5 gm in 250 mls @ 13.2 mls/hr IV 

TITRATE ROSHNI; 25 MCG/KG/MIN


   PRN Reason: Protocol


   Last Titration: 01/09/18 20:54 Dose:  100 mcg/kg/min, 52.798 mls/hr


Levothyroxine Sodium (Synthroid)  88 mcg PO ACBREAKFAST [facility]NI


   Last Admin: 01/09/18 05:54 Dose:  88 mcg


Lidocaine (Lidoderm 5%)  700 mg TOP DAILY [facility]


   Last Admin: 01/09/18 15:47 Dose:  700 mg


Lorazepam (Ativan)  0.5 mg PO Q4H PRN


   PRN Reason: Anxiety


   Last Admin: 01/09/18 20:58 Dose:  0.5 mg


Magnesium Oxide (Magnesium Oxide)  400 mg PO BID [facility]


   Last Admin: 01/09/18 20:00 Dose:  400 mg


Metoprolol Tartrate (Lopressor)  50 mg PO BID@1200,2100 [facility]


   Last Admin: 01/09/18 20:01 Dose:  50 mg


Metoprolol Tartrate (Lopressor)  5 mg IVPUSH Q4H PRN


   PRN Reason: Tachycardia


   Last Admin: 01/09/18 17:50 Dose:  5 mg


Miscellaneous Information (Remove Patch)  1 ea TRDERM BEDTIME [facility]


Ondansetron HCl (Zofran)  4 mg IVPUSH Q8H PRN


   PRN Reason: Nausea/Vomiting


   Last Admin: 01/09/18 01:38 Dose:  4 mg


Oxycodone/Acetaminophen (Percocet 325-5 Mg)  1 tab PO Q4H PRN


   PRN Reason: Pain


   Last Admin: 01/09/18 20:01 Dose:  1 tab


Ptom: Ubidecarenone (100mg (Coenzyme-Q))  1 each PO DAILY [facility]


   Last Admin: 01/09/18 09:26 Dose:  Not Given


Polyethylene Glycol (Miralax)  17 gm PO BEDTIME PRN


   PRN Reason: Constipation


   Last Admin: 01/07/18 08:26 Dose:  17 gm


Simvastatin (Zocor)  10 mg PO BEDTIME [facility]


   Last Admin: 01/09/18 20:00 Dose:  10 mg


Sodium Chloride (Saline Flush)  10 ml FLUSH ONETIME PRN


   PRN Reason: IV FLUSH


   Last Admin: 01/05/18 10:03 Dose:  10 ml


Vit A/Vit C/Vit E/Selen/Cu/Zn/Lutei (Icaps Mv)  1 tab PO BID [facility]


   Last Admin: 01/09/18 20:00 Dose:  1 tab


Warfarin Sodium (Coumadin)  7.5 mg PO MoWeFr@1800 [facility]


   Last Admin: 01/05/18 17:37 Dose:  7.5 mg


Warfarin Sodium (Coumadin)  5 mg PO SuTuThSa@1800 [facility]


   Last Admin: 01/07/18 17:00 Dose:  5 mg





Discontinued Medications





Hydrocodone Bitart/Acetaminophen (Norco 325-5 Mg)  1 tab PO ONETIME ONE


   Stop: 01/08/18 23:59


   Last Admin: 01/09/18 00:07 Dose:  1 tab


Bisacodyl (Dulcolax)  10 mg RECTAL ONETIME ONE


   Stop: 01/07/18 21:33


   Last Admin: 01/08/18 00:27 Dose:  Not Given


Bisacodyl (Dulcolax)  10 mg RECTAL ONETIME ONE


   Stop: 01/07/18 21:53


   Last Admin: 01/07/18 22:03 Dose:  10 mg


Digoxin (Lanoxin)  0.25 mcg IVPUSH ONETIME ONE


   Stop: 01/09/18 21:48


Enoxaparin Sodium (Lovenox)  40 mg SUBCUT DAILY [facility]


   Last Admin: 01/05/18 10:01 Dose:  Not Given


Fentanyl (Duragesic)  12 mcg TRDERM Q72H [facility]


   Last Admin: 01/04/18 18:47 Dose:  12 mcg


Fentanyl (Duragesic)  12 mcg TRDERM Q72H [facility]


   Last Admin: 01/07/18 11:50 Dose:  12 mcg


Hydromorphone HCl (Dilaudid)  1 mg IVPUSH ONETIME ONE


   Stop: 01/04/18 11:07


   Last Admin: 01/04/18 11:10 Dose:  1 mg


Hydromorphone HCl (Dilaudid) Confirm Administered Dose 1 mg .ROUTE .STK-MED ONE


   Stop: 01/04/18 11:13


   Last Admin: 01/04/18 11:16 Dose:  Not Given


Hydromorphone HCl (Dilaudid)  1 mg IVPUSH Q6H PRN


   PRN Reason: Pain (severe 7-10)


   Last Admin: 01/04/18 18:19 Dose:  1 mg


Hydromorphone HCl (Dilaudid)  1 mg IVPUSH Q2H PRN


   PRN Reason: Pain


   Last Admin: 01/07/18 07:54 Dose:  1 mg


Sodium Chloride (Normal Saline)  500 mls @ 250 mls/hr IV .BOLUS ONE


   Stop: 01/04/18 12:35


   Last Admin: 01/04/18 10:42 Dose:  250 mls/hr


Sodium Chloride (Normal Saline) Confirm Administered Dose 1,000 mls @ as 

directed .ROUTE .STK-MED ONE


   Stop: 01/04/18 10:45


   Last Admin: 01/04/18 10:43 Dose:  Not Given


Lactated Ringer's (Ringers, Lactated)  1,000 mls @ 75 mls/hr IV ASDIRECTED [facility]


   Stop: 01/05/18 06:00


   Last Admin: 01/04/18 20:00 Dose:  75 mls/hr


Sodium Chloride (Normal Saline)  250 mls @ 250 mls/hr IV .BOLUS ONE


   Stop: 01/07/18 20:49


   Last Admin: 01/07/18 20:06 Dose:  Not Given


Sodium Chloride (Normal Saline)  500 mls @ 500 mls/hr IV .BOLUS ONE


   Stop: 01/07/18 20:49


   Last Admin: 01/07/18 20:10 Dose:  Not Given


Sodium Chloride (Normal Saline)  250 mls @ 250 mls/hr IV .BOLUS ONE


   Stop: 01/07/18 20:56


   Last Admin: 01/07/18 20:28 Dose:  250 mls/hr


Lactated Ringer's (Ringers, Lactated)  1,000 mls @ 100 mls/hr IV ASDIRECTED [facility]


   Stop: 01/08/18 22:00


   Last Admin: 01/08/18 16:27 Dose:  100 mls/hr


Sodium Chloride (Normal Saline)  250 mls @ 999 mls/hr IV .BOLUS ONE


   Stop: 01/09/18 17:50


   Last Admin: 01/09/18 17:50 Dose:  999 mls/hr


Iopamidol (Isovue-300 (61%))  100 ml IVPUSH ONETIME ONE


   Stop: 01/04/18 10:44


   Last Admin: 01/04/18 11:23 Dose:  100 ml


Ketorolac Tromethamine (Toradol)  15 mg IVPUSH Q8H [facility]


   Stop: 01/06/18 10:01


   Last Admin: 01/06/18 10:17 Dose:  15 mg


Ketorolac Tromethamine (Toradol)  30 mg IVPUSH ONETIME ONE


   Stop: 01/07/18 11:22


   Last Admin: 01/07/18 11:49 Dose:  30 mg


Ketorolac Tromethamine (Toradol)  15 mg IVPUSH Q6H [facility]


   Stop: 01/08/18 11:31


Ketorolac Tromethamine (Toradol)  15 mg IVPUSH Q6H [facility]


   Stop: 01/08/18 09:31


   Last Admin: 01/08/18 09:11 Dose:  15 mg


Magnesium Oxide (Magnesium Oxide)  400 mg PO DAILY [facility]


   Last Admin: 01/07/18 08:25 Dose:  400 mg


Methylprednisolone Sodium Succinate (Solu-Medrol)  125 mg IVPUSH ONETIME ONE


   Stop: 01/07/18 19:53


   Last Admin: 01/07/18 20:28 Dose:  125 mg


Metoprolol Succinate (Toprol Xl)  100 mg PO DAILY [facility]


   Last Admin: 01/05/18 09:49 Dose:  100 mg


Metoprolol Tartrate (Lopressor)  50 mg PO Q12HR [facility]


   Last Admin: 01/06/18 09:47 Dose:  Not Given


Metoprolol Tartrate (Lopressor)  50 mg PO ONETIME ONE


   Stop: 01/07/18 09:27


   Last Admin: 01/07/18 09:35 Dose:  Not Given


Metoprolol Tartrate (Lopressor) Confirm Administered Dose 5 mg .ROUTE .STK-MED 

ONE


   Stop: 01/09/18 11:16


   Last Admin: 01/09/18 11:27 Dose:  Not Given


Metoprolol Tartrate (Lopressor)  5 mg IVPUSH ONETIME STA


   Stop: 01/09/18 11:21


   Last Admin: 01/09/18 11:36 Dose:  5 mg


Miscellaneous Information (Remove Patch)  1 ea TRDERM Q72H [facility]


Miscellaneous Information (Remove Patch)  1 ea TRDERM Q72H [facility]


Miscellaneous Information (Remove Patch)  1 ea TRDERM NOW STA


   Stop: 01/07/18 19:54


   Last Admin: 01/07/18 20:41 Dose:  1 ea


Morphine Sulfate (Morphine)  2 mg IVPUSH ONETIME ONE


   Stop: 01/04/18 10:36


   Last Admin: 01/04/18 10:40 Dose:  2 mg


Morphine Sulfate (Morphine)  2 mg IVPUSH ONETIME ONE


   Stop: 01/04/18 16:49


   Last Admin: 01/04/18 18:05 Dose:  Not Given


Phytonadione (Aquamephyton)  5 mg PO ONETIME ONE


   Stop: 01/08/18 19:59


   Last Admin: 01/08/18 21:16 Dose:  5 mg


Scopolamine (Scopolamine)  1 each TRDERM Q72H PRN


   PRN Reason: Nausea


Vit A/Vit C/Vit E/Selen/Cu/Zn/Lutei (Icaps Mv)  2 tab PO BID [facility]


   Last Admin: 01/06/18 21:22 Dose:  1 tab











- Exam


Quality Assessment: Reports: Supplemental Oxygen, DVT Prophylaxis


General: Reports: Alert, Oriented, Cooperative, Moderate Distress


HEENT: Reports: Pupils Equal, Pupils Reactive, EOMI, Mucous Membr. Moist/Pink


Neck: Reports: Supple, Trachea Midline, No JVD, No Thyromegaly


Lungs: Reports: Decreased Breath Sounds (especially left sided - patient unable 

to take a deep breath due to pain ), Wheezing (right base )


GI/Abdominal Exam: Normal Bowel Sounds, Soft, Non-Tender, No Organomegaly, No 

Distention, No Abnormal Bruit, No Mass, Pelvis Stable


 (Female) Exam: Deferred


Rectal (Female) Exam: Deferred


Back Exam: Reports: Decreased Range of Motion (due to pain), Other (large 

bruise on left side )


Extremities: Normal Inspection, Normal Range of Motion, Non-Tender, Normal 

Capillary Refill, Pedal Edema (1-2+)


Skin: Reports: Warm, Dry, Intact


Wound/Incisions: Reports: Other (Ches tube secured in place on left side. Some 

bloody drainage noted in tube along with clots.)


Neurological: Reports: No New Focal Deficit


Psy/Mental Status: Reports: Alert, Anxious, Agitated





*Q Meaningful Use (DIS)





- VTE *Q


VTE Criteria *Q: 








- Stroke *Q


Stroke Criteria *Q: 








- AMI *Q


AMI Criteria *Q:

## 2018-01-09 NOTE — CR
Chest: Portable view of the chest was obtained.

 

Comparison: Prior chest x-ray of 01/08/18.

 

Left basilar chest tube is seen.  Minimal apical pneumothorax is seen 

which has improved from prior study.  Mild atelectasis within the 

right lung base is seen.  Mild increased density within the left mid 

to lower lung is seen most likely due to additional atelectasis.  

Sternotomy is noted.  Surgical clips within the upper right abdomen 

are seen.  Bony structures are grossly intact.

 

Impression:

1.  Left basilar chest tube.  Minimal apical pneumothorax is seen 

improved from prior exam.

2.  Areas of atelectasis as noted above.

 

Diagnostic code #3

## 2018-01-10 NOTE — PCM.SN
- Free Text/Narrative


Note: 


Patient and  were nothing but difficult customers to please. I inferred 

during my initial contact with them that they were very important members of 

the community.  questioned everything and yet they were never told by 

previous providers about his wife's pneumothorax from chest tube placement. I 

did my best to explain to both of them about her clinical progress and what to 

expect from here on but both were very dissatisfied and dismissive. 





However I did put her on cardizem drip initially to address her fast heart rate 

but with increasing frequency of her dilaudid, her blood pressures slowly 

dropped significantly. From there, I decided to switch her to esmolol drip for 

better HR control but her pain remained poorly controlled and proceeded to ask 

for more pain medications. We called Dr. Haynes to do something about it but 

only offered ativan to calm her down. 





I explained further to them that we needed to be careful with narcotics due to 

her hypotension but they demanded for a cardiologist or another doctor. They 

also demanded that her PCP be called and informed about her situation. I did 

try to reach her PCP (Dr. Arvizu) but without any success.





Throughout my encounter with them I was respectful, polite, and kind to both of 

them. However I received nothing in return but disrespect towards me and with 

condescending attitude requested multiple times that they be shipped out to 

[place], [place] for upper level of care. 





I did talk to Dr. Haynes and planned for transfer. Patient was then referred 

for lateral transfer to [facility] with arrangement made by Dr. Haynes. We had 

difficulty sending her out initially but with patience, she was successfully 

shipped out to [facility] ED under the services of Dr. Jara.





Of note, we tried fixed and chopper to send her out of here but due to 

inclement weather, neither was able to provide services. We also asked for 

their input but could not offer anything. No personal  or private plane 

available for faster transportation. Therefore she was sent via ground. The 

 and patient were very much aware about her clinical and hemodynamic 

instability at the time of transfer but still proceeded with the plan to go to 

[place] as requested.

## 2018-01-10 NOTE — CR
Chest: Portable view of the chest was obtained.

 

Comparison: Prior chest x-ray performed earlier on the same day (7:25 

AM).

 

Left basilar chest tube again noted.  Minimal apical pneumothorax 

remains.  Central lung markings are increased.  Mild basilar 

atelectasis remains on both sides.  Sternotomy wires and calcified 

breast prosthesis are noted.  Rib fractures are again noted.

 

Impression:

1.  Left basilar chest tube with minimal apical pneumothorax 

remaining.

2.  Other findings within the chest are also stable.

 

Diagnostic code #3

 

Agree with preliminary report issued by [facility] ([facility] 

preliminary report dictated on 01/09/18, 11:55 PM Central Time)

## 2018-02-06 ENCOUNTER — HOSPITAL ENCOUNTER (EMERGENCY)
Dept: HOSPITAL 41 - JD.ED | Age: 83
Discharge: HOME | End: 2018-02-06
Payer: MEDICARE

## 2018-02-06 VITALS — SYSTOLIC BLOOD PRESSURE: 83 MMHG | DIASTOLIC BLOOD PRESSURE: 69 MMHG

## 2018-02-06 DIAGNOSIS — T50.0X1A: ICD-10-CM

## 2018-02-06 DIAGNOSIS — Z88.5: ICD-10-CM

## 2018-02-06 DIAGNOSIS — Z79.01: ICD-10-CM

## 2018-02-06 DIAGNOSIS — E03.9: ICD-10-CM

## 2018-02-06 DIAGNOSIS — T44.6X1A: Primary | ICD-10-CM

## 2018-02-06 DIAGNOSIS — Z87.891: ICD-10-CM

## 2018-02-06 DIAGNOSIS — R42: ICD-10-CM

## 2018-02-06 DIAGNOSIS — T46.4X1A: ICD-10-CM

## 2018-02-06 DIAGNOSIS — Z88.2: ICD-10-CM

## 2018-02-06 DIAGNOSIS — Z79.899: ICD-10-CM

## 2018-02-06 DIAGNOSIS — T44.7X1A: ICD-10-CM

## 2018-02-06 DIAGNOSIS — T50.991A: ICD-10-CM

## 2018-02-06 DIAGNOSIS — I95.9: ICD-10-CM

## 2018-02-06 PROCEDURE — 80053 COMPREHEN METABOLIC PANEL: CPT

## 2018-02-06 PROCEDURE — 85610 PROTHROMBIN TIME: CPT

## 2018-02-06 PROCEDURE — 36415 COLL VENOUS BLD VENIPUNCTURE: CPT

## 2018-02-06 PROCEDURE — 99285 EMERGENCY DEPT VISIT HI MDM: CPT

## 2018-02-06 PROCEDURE — 85025 COMPLETE CBC W/AUTO DIFF WBC: CPT

## 2018-02-06 PROCEDURE — 93005 ELECTROCARDIOGRAM TRACING: CPT

## 2018-02-06 PROCEDURE — 96360 HYDRATION IV INFUSION INIT: CPT

## 2018-02-06 PROCEDURE — 84484 ASSAY OF TROPONIN QUANT: CPT

## 2018-02-06 NOTE — EDM.PDOC
ED HPI GENERAL MEDICAL PROBLEM





- General


Chief Complaint: General


Stated Complaint: TOOK HUSBANDS BLOOD PRESSURE MEDS BY MISTAKE


Time Seen by Provider: 02/06/18 10:25


Source of Information: Reports: Patient, Family, Provider


History Limitations: Reports: No Limitations





- History of Present Illness


INITIAL COMMENTS - FREE TEXT/NARRATIVE: 





The patient presents because she is lightheaded and she has low blood pressure.

  This morning she accidentally took her husbands medications.  Her  

puts them in a cup and has them out for both of them but this morning she sat 

in the wrong place and took her husbands.  She got lightheaded after that and 

she went to [facility] and they checked her blood pressure and it was in 

the 80s.  They sent her here for further evaluation.  She was recently admitted 

to the hospital and eventually sent to [place] because of 5 rib fractures on 

the left chest.  She needed a chest tube.  She still has some pain on that left 

side with deep breathing and movement.  She denies fever, chills, cough, 

shortness of breath, abdominal pain, nausea or vomiting.  Her 's 

medications include doxazosin 4mg, spironolactone 25mg, dutasteride 0.5mg, 

lisinopril 5mg, metoprolol 25mg, and simvastatin 20mg.


Onset: Gradual


Duration: Hour(s):


Severity: Moderate


Improves with: Reports: Immobilization


Worsens with: Reports: Movement


Associated Symptoms: Reports: Chest Pain (Left side from prior rib fractures 

and chest tube).  Denies: Cough, Fever/Chills, Headaches, Nausea/Vomiting, 

Shortness of Breath





- Related Data


 Allergies











Allergy/AdvReac Type Severity Reaction Status Date / Time


 


Sulfa (Sulfonamide Allergy  Cannot Verified 01/04/18 16:53





Antibiotics)   Remember  


 


codeine AdvReac  Disorientat Verified 01/08/18 07:29





   ion  











Home Meds: 


 Home Meds





Amitriptyline [Elavil] 25 mg PO BEDTIME 02/06/18 [History]


Clopidogrel [Plavix] 75 mg PO DAILY 02/06/18 [History]


Ondansetron HCl [Zofran] 4 mg PO Q6HR PRN 02/06/18 [History]


PARoxetine HCl [Paroxetine HCl] 10 mg PO DAILY 02/06/18 [History]


Warfarin Sodium 5 mg PO SUTUWETHSA 02/06/18 [History]


Warfarin Sodium 7.5 mg PO MOFR 02/06/18 [History]


atorvaSTATin [Lipitor] 10 mg PO DAILY 02/06/18 [History]


oxyCODONE HCl/Acetaminophen [Oxycodone-Acetaminophen 5-300] 1 each PO Q6HR PRN 

02/06/18 [History]











Past Medical History


HEENT History: Reports: Cataract, Macular Degeneration


Cardiovascular History: Reports: Afib, MI, Stents


Other Cardiovascular History: resolved after surgery; end of october of last 

year


Respiratory History: Reports: None, Other (See Below)


Other Respiratory History: rib fx


Other Gastrointestinal History: during surgery intestine punctured


Genitourinary History: Reports: Urinary Incontinence


OB/GYN History: Reports: Pregnancy


Other OB/BYN History: x3


Musculoskeletal History: Reports: Arthritis, Fracture, Fibromyalgia


Other Musculoskeletal History: hx of Fractured left side rib


Neurological History: Reports: None


Psychiatric History: Reports: Anxiety


Endocrine/Metabolic History: Reports: Hypothyroidism


Hematologic History: Reports: None


Immunologic History: Reports: None


Oncologic (Cancer) History: Reports: None


Dermatologic History: Reports: None





- Infectious Disease History


Infectious Disease History: Reports: Chicken Pox





- Past Surgical History


Head Surgeries/Procedures: Reports: None


HEENT Surgical History: Reports: Cataract Surgery


Other HEENT Surgeries/Procedures: bi-lat


Cardiovascular Surgical History: Reports: Other (See Below)


Other Cardiovascular Surgeries/Procedures: aortic anuerysm


GI Surgical History: Reports: Cholecystectomy


Female  Surgical History: Reports: Hysterectomy


Endocrine Surgical History: Reports: None


Neurological Surgical History: Reports: None


Musculoskeletal Surgical History: Reports: None





Social & Family History





- Family History


Family Medical History: Noncontributory





- Tobacco Use


Smoking Status *Q: Never Smoker


Years of Tobacco use: 12


Packs/Tins Daily: 1


Used Tobacco, but Quit: Yes


Month Tobacco Last Used: 20 years ago


Second Hand Smoke Exposure: No





- Caffeine Use


Caffeine Use: Reports: None





- Alcohol Use


Days Per Week of Alcohol Use: 0





- Recreational Drug Use


Recreational Drug Use: No





ED ROS GENERAL





- Review of Systems


Review Of Systems: See Below


Constitutional: Reports: No Symptoms


HEENT: Reports: No Symptoms


Respiratory: Reports: No Symptoms


Cardiovascular: Reports: Chest Pain, Lightheadedness


Endocrine: Reports: No Symptoms


GI/Abdominal: Reports: No Symptoms


: Reports: No Symptoms


Musculoskeletal: Reports: No Symptoms


Skin: Reports: No Symptoms


Neurological: Reports: No Symptoms





ED EXAM, GENERAL





- Physical Exam


Exam: See Below


Exam Limited By: No Limitations


General Appearance: Alert, No Apparent Distress


Ears: Normal External Exam


Nose: Normal Inspection


Head: Atraumatic, Normocephalic


Neck: Normal Inspection


Respiratory/Chest: No Respiratory Distress, Lungs Clear, Normal Breath Sounds


Cardiovascular: Regular Rate, Rhythm, No Edema, No Murmur


GI/Abdominal: Soft, Non-Tender, No Organomegaly, No Mass


Back Exam: Normal Inspection


Extremities: Normal Inspection


Neurological: Alert, Oriented, No Motor/Sensory Deficits





EKG INTERPRETATION


EKG Date: 02/06/18


Time: 10:53


Rhythm: NSR


Rate (Beats/Min): 72


Axis: Normal


P-Wave: Present


QRS: Normal


ST-T: Normal


QT: Normal





Course





- Vital Signs


Last Recorded V/S: 


 Last Vital Signs











Temp  96.9 F   02/06/18 10:20


 


Pulse  64   02/06/18 10:20


 


Resp  18   02/06/18 10:20


 


BP  83/69 L  02/06/18 10:41


 


Pulse Ox  97   02/06/18 10:20














- Orders/Labs/Meds


Orders: 


 Active Orders 24 hr











 Category Date Time Status


 


 Cardiac Monitoring [RC] .AS DIRECTED Care  02/06/18 10:34 Active


 


 EKG Documentation Completion [RC] STAT Care  02/06/18 10:35 Active


 


 Peripheral IV Care [RC] .AS DIRECTED Care  02/06/18 10:35 Active


 


 Sodium Chloride 0.9% [Normal Saline] 1,000 ml Med  02/06/18 10:45 Active





 IV .BOLUS   


 


 Sodium Chloride 0.9% [Saline Flush] Med  02/06/18 10:34 Active





 10 ml FLUSH ASDIRECTED PRN   


 


 Peripheral IV Insertion Adult [OM.PC] Stat Oth  02/06/18 10:34 Ordered








 Medication Orders





Sodium Chloride (Normal Saline)  1,000 mls @ 1,000 mls/hr IV .BOLUS ROSHNI


   Last Admin: 02/06/18 10:45  Dose: 1,000 mls/hr


Sodium Chloride (Saline Flush)  10 ml FLUSH ASDIRECTED PRN


   PRN Reason: Keep Vein Open


   Last Admin: 02/06/18 10:45  Dose: 10 ml








Labs: 


 Laboratory Tests











  02/06/18 02/06/18 02/06/18 Range/Units





  10:42 10:42 10:42 


 


WBC  9.98    (3.98-10.04)  K/mm3


 


RBC  3.84 L    (3.98-5.22)  M/mm3


 


Hgb  11.1 L    (11.2-15.7)  gm/L


 


Hct  34.9    (34.1-44.9)  %


 


MCV  90.9    (79.4-94.8)  fl


 


MCH  28.9    (25.6-32.2)  pg


 


MCHC  31.8 L    (32.2-35.5)  g/dl


 


RDW Std Deviation  45.4    (36.4-46.3)  fL


 


Plt Count  371 H    (182-369)  K/mm3


 


MPV  8.0 L    (9.4-12.3)  fl


 


Neut % (Auto)  69.2    (34.0-71.1)  %


 


Lymph % (Auto)  16.9 L    (19.3-51.7)  %


 


Mono % (Auto)  11.6    (4.7-12.5)  %


 


Eos % (Auto)  1.6    (0.7-5.8)  


 


Baso % (Auto)  0.4    (0.1-1.2)  %


 


Neut # (Auto)  6.90 H    (1.56-6.13)  K/mm3


 


Lymph # (Auto)  1.69    (1.18-3.74)  K/mm3


 


Mono # (Auto)  1.16 H    (0.24-0.36)  K/mm3


 


Eos # (Auto)  0.16    (0.04-0.36)  K/mm3


 


Baso # (Auto)  0.04    (0.01-0.08)  K/mm3


 


PT    26.3 H  (8.0-13.0)  SECONDS


 


INR    2.29  


 


Sodium   139   (136-145)  mEq/L


 


Potassium   3.6   (3.5-5.1)  mEq/L


 


Chloride   103   ()  mEq/L


 


Carbon Dioxide   27   (21-32)  mEq/L


 


Anion Gap   12.6   (5-15)  


 


BUN   13   (7-18)  mg/dL


 


Creatinine   1.0   (0.55-1.02)  mg/dL


 


Est Cr Clr Drug Dosing   40.60   mL/min


 


Estimated GFR (MDRD)   53   (>60)  mL/min


 


BUN/Creatinine Ratio   13.0 L   (14-18)  


 


Glucose   106   ()  mg/dL


 


Calcium   8.6   (8.5-10.1)  mg/dL


 


Total Bilirubin   0.5   (0.2-1.0)  mg/dL


 


AST   21   (15-37)  U/L


 


ALT   20   (14-59)  U/L


 


Alkaline Phosphatase   77   ()  U/L


 


Troponin I   < 0.017   (0.00-0.056)  ng/mL


 


Total Protein   6.8   (6.4-8.2)  g/dl


 


Albumin   2.9 L   (3.4-5.0)  g/dl


 


Globulin   3.9   gm/dL


 


Albumin/Globulin Ratio   0.7 L   (1-2)  











Meds: 


Medications











Generic Name Dose Route Start Last Admin





  Trade Name Freq  PRN Reason Stop Dose Admin


 


Sodium Chloride  1,000 mls @ 1,000 mls/hr  02/06/18 10:45  02/06/18 10:45





  Normal Saline  IV   1,000 mls/hr





  .BOLUS ROSHNI   Administration


 


Sodium Chloride  10 ml  02/06/18 10:34  02/06/18 10:45





  Saline Flush  FLUSH   10 ml





  ASDIRECTED PRN   Administration





  Keep Vein Open   














- Re-Assessments/Exams


Free Text/Narrative Re-Assessment/Exam: 





02/06/18 10:57


I ordered an IV NS 500mL bolus, labs, and EKG.


02/06/18 11:57


Her EKG shows a NSR with no acute changes.  CBC looks good.  Her INR was 2.29.  

Her CMP looks good.  Her troponin was negative.  She feels better.  Her blood 

pressure is 133 systolic.  I called Dr Arvizu and he wanted her to follow her 

blood pressure the next few days.





Departure





- Departure


Time of Disposition: 12:00


Disposition: Home, Self-Care 01


Condition: Good


Clinical Impression: 


Accidental overdose


Qualifiers:


 Encounter type: initial encounter Qualified Code(s): T50.901A - Poisoning by 

unspecified drugs, medicaments and biological substances, accidental (

unintentional), initial encounter





Hypotension


Qualifiers:


 Hypotension type: unspecified hypotension type Qualified Code(s): I95.9 - 

Hypotension, unspecified








- Discharge Information


Referrals: 


Ryne Arvizu MD [Primary Care Provider] - 1 Week


Forms:  ED Department Discharge


Additional Instructions: 


Take your medication as prescribed.  Take your blood pressure 3 times per day 

for 2 days.  Follow up with Dr Arvizu this next week.





- My Orders


Last 24 Hours: 


My Active Orders





02/06/18 10:34


Cardiac Monitoring [RC] .AS DIRECTED 


Sodium Chloride 0.9% [Saline Flush]   10 ml FLUSH ASDIRECTED PRN 


Peripheral IV Insertion Adult [OM.PC] Stat 





02/06/18 10:35


EKG Documentation Completion [RC] STAT 


Peripheral IV Care [RC] .AS DIRECTED 





02/06/18 10:45


Sodium Chloride 0.9% [Normal Saline] 1,000 ml IV .BOLUS 














- Assessment/Plan


Last 24 Hours: 


My Active Orders





02/06/18 10:34


Cardiac Monitoring [RC] .AS DIRECTED 


Sodium Chloride 0.9% [Saline Flush]   10 ml FLUSH ASDIRECTED PRN 


Peripheral IV Insertion Adult [OM.PC] Stat 





02/06/18 10:35


EKG Documentation Completion [RC] STAT 


Peripheral IV Care [RC] .AS DIRECTED 





02/06/18 10:45


Sodium Chloride 0.9% [Normal Saline] 1,000 ml IV .BOLUS

## 2018-03-06 NOTE — PCM.HP
H&P History of Present Illness





- General


Date of Service: 01/04/18


Admit Problem/Dx: 


 Admission Diagnosis/Problem





Admission Diagnosis/Problem      Rib injury








Source of Information: Patient, Provider


History Limitations: Reports: No Limitations





- History of Present Illness


Initial Comments - Free Text/Narative: 





82 year old female s/p fall, hitting furniture with her back and left side.  

She felt dizzy after taking a hand full of her cardiac medication on an empty 

stomach.  She has had a radiographic study that documents multiple left sided 

rib fractures.  She is on coumadin as well as plavix for coronary stents.  The 

patient stated that her metoprolol had been recently increased.  She has had no 

N/V, SOB, CP, orthopnea, PND, syncopal/presyncopal episodes.  The patient will 

be admitted to obs with telemetry.


Onset of Symptoms: Reports: Sudden


Symptom Onset Date: 01/04/18


Duration of Symptoms: Reports: Hour(s):


Location: Reports: Chest


Quality: Reports: Stabbing, Throbbing


Severity: Moderate


Improves with: Reports: Medication


Worsens with: Reports: Movement


Associated Symptoms: Reports: Chest Pain, Weakness


  ** Middle Back


Pain Score (Numeric/FACES): 6





- Related Data


Allergies/Adverse Reactions: 


 Allergies











Allergy/AdvReac Type Severity Reaction Status Date / Time


 


codeine Allergy  Disorientat Verified 01/04/18 16:53





   ion  


 


Sulfa (Sulfonamide Allergy  Cannot Verified 01/04/18 16:53





Antibiotics)   Remember  











Home Medications: 


 Home Meds





Alpha Lipoic Acid [Alpha-Lipoic Acid] 200 mg PO DAILY 06/08/15 [History]


Amitriptyline [Elavil] 25 mg PO BEDTIME 06/08/15 [History]


Cinnamon Bark [Cinnamon] 500 mg PO DAILY 06/08/15 [History]


Fish Oil/Omega-3 Fatty Acids [Fish Oil] 1,200 mg PO DAILY 06/08/15 [History]


Metoprolol Succinate [Toprol XL] 100 mg PO DAILY 06/08/15 [History]


Ubidecarenone [Co Q-10] 100 mg PO DAILY 06/08/15 [History]


Clopidogrel [Plavix] 75 mg PO DAILY 12/25/17 [History]


Levothyroxine [Synthroid] 88 mcg PO DAILY 12/25/17 [History]


Magnesium 250 mg PO DAILY 12/25/17 [History]


Multivit-Min/FA/Lycopene/Lut [Centrum Silver Tablet] 1 tab PO DAILY 12/25/17 [

History]


Eliseo Colon Health  1 tab PO DAILY 12/25/17 [History]


Vit A/Vit C/Vit E/Zinc/Copper [Icaps Areds Formula] 2 tab PO BID 12/25/17 [

History]


Warfarin [Coumadin] 5 mg PO SUTUTHSA 12/25/17 [History]


Warfarin [Coumadin] 7.5 mg PO MOWEFR 12/25/17 [History]


atorvaSTATin [Lipitor] 10 mg PO DAILY 12/25/17 [History]











Past Medical History


HEENT History: Reports: Cataract, Macular Degeneration


Cardiovascular History: Reports: Afib, MI, Stents


Other Cardiovascular History: resolved after surgery; end of october of last 

year


Respiratory History: Reports: None


Other Gastrointestinal History: during surgery intestine punctured


Genitourinary History: Reports: Urinary Incontinence


OB/GYN History: Reports: Pregnancy


Other OB/BYN History: x3


Musculoskeletal History: Reports: Arthritis, Fracture, Fibromyalgia


Other Musculoskeletal History: hx of Fractured left side rib


Neurological History: Reports: None


Psychiatric History: Reports: Anxiety


Endocrine/Metabolic History: Reports: Hypothyroidism


Hematologic History: Reports: None


Immunologic History: Reports: None


Oncologic (Cancer) History: Reports: None


Dermatologic History: Reports: None





- Infectious Disease History


Infectious Disease History: Reports: Chicken Pox





- Past Surgical History


Head Surgeries/Procedures: Reports: None


HEENT Surgical History: Reports: Cataract Surgery


Other HEENT Surgeries/Procedures: bi-lat


Cardiovascular Surgical History: Reports: Other (See Below)


Other Cardiovascular Surgeries/Procedures: aortic anuerysm


GI Surgical History: Reports: Cholecystectomy


Female  Surgical History: Reports: Hysterectomy


Endocrine Surgical History: Reports: None


Neurological Surgical History: Reports: None


Musculoskeletal Surgical History: Reports: None





Social & Family History





- Family History


Family Medical History: Noncontributory





- Tobacco Use


Smoking Status *Q: Former Smoker


Years of Tobacco use: 12


Packs/Tins Daily: 1


Used Tobacco, but Quit: Yes


Month Tobacco Last Used: 20 years ago


Second Hand Smoke Exposure: No





- Caffeine Use


Caffeine Use: Reports: None





- Alcohol Use


Days Per Week of Alcohol Use: 0





- Recreational Drug Use


Recreational Drug Use: No





H&P Review of Systems





- Review of Systems:


Review Of Systems: See Below


General: Reports: Weakness, Decreased Appetite


HEENT: Reports: No Symptoms


Pulmonary: Reports: Shortness of Breath, Pleuritic Chest Pain


Cardiovascular: Reports: No Symptoms


Gastrointestinal: Reports: Abdominal Pain, Decreased Appetite


Genitourinary: Reports: No Symptoms


Musculoskeletal: Reports: Back Pain, Other (left sided rib pain)


Skin: Reports: No Symptoms


Psychiatric: Reports: No Symptoms


Neurological: Reports: No Symptoms


Hematologic/Lymphatic: Reports: No Symptoms


Immunologic: Reports: No Symptoms





Exam





- Exam


Exam: See Below





- Vital Signs


Vital Signs: 


 Last Vital Signs











Temp  36.8 C   01/04/18 15:12


 


Pulse  55 L  01/04/18 15:12


 


Resp  20   01/04/18 15:12


 


BP  127/59 L  01/04/18 15:12


 


Pulse Ox  99   01/04/18 15:12











Weight: 84.459 kg





- Exam


Quality Assessment: Supplemental Oxygen, DVT Prophylaxis


General: Alert, Oriented, Cooperative, Mild Distress


HEENT: Conjunctiva Clear, EOMI, Nares Patent, Normal Nasal Septum, Pupils Equal

, Pupils Reactive, PERRLA


Neck: Supple, Trachea Midline


Lungs: Normal Respiratory Effort, Decreased Breath Sounds


Cardiovascular: Regular Rate


GI/Abdominal Exam: Normal Bowel Sounds, Soft, Non-Tender, No Organomegaly, No 

Distention


 (Female) Exam: Deferred


Rectal (Female) Exam: Deferred


Back Exam: Normal Inspection, Decreased Range of Motion, Other (rib pain)


Extremities: Normal Inspection, Arm Pain, Limited Range of Motion (left arm)


Skin: Warm


Neurological: Cranial Nerves Intact


Neuro Extensive - Mental Status: Alert, Oriented x3


Neuro Extensive - Motor, Sensory, Reflexes: CN II-XII Intact


Psychiatric: Alert, Normal Affect, Normal Mood





- Patient Data


Result Diagrams: 


 01/04/18 09:50





 01/04/18 09:50





*Q Meaningful Use (ADM)





- VTE *Q


VTE Criteria *Q: 








- Stroke *Q


Stroke Criteria *Q: 








- AMI *Q


AMI Criteria *Q: 








- Problem List


(1) Multiple fractures of ribs, left side, initial encounter for closed fracture


SNOMED Code(s): 06978580


   ICD Code: S22.42XA - MULTIPLE FRACTURES OF RIBS, LEFT SIDE, INIT FOR CLOS FX

   Status: Acute   Current Visit: Yes   





(2) Abdominal pain


SNOMED Code(s): 46087652


   ICD Code: R10.9 - UNSPECIFIED ABDOMINAL PAIN   Status: Acute   Current Visit

: No   


Qualifiers: 


   Abdominal location: epigastric   Qualified Code(s): R10.13 - Epigastric pain

   





(3) Atrial fibrillation with RVR


SNOMED Code(s): 618221630695146


   ICD Code: I48.91 - UNSPECIFIED ATRIAL FIBRILLATION   Status: Acute   Current 

Visit: No   





(4) Chest pain


SNOMED Code(s): 26709559


   ICD Code: R07.9 - CHEST PAIN, UNSPECIFIED   Status: Acute   Current Visit: 

No   


Qualifiers: 


   Chest pain type: unspecified   Qualified Code(s): R07.9 - Chest pain, 

unspecified   





(5) Hypertension


SNOMED Code(s): 53625721


   ICD Code: I10 - ESSENTIAL (PRIMARY) HYPERTENSION   Status: Acute   Current 

Visit: No   


Qualifiers: 


   Hypertension type: essential hypertension   Qualified Code(s): I10 - 

Essential (primary) hypertension   


Problem List Initiated/Reviewed/Updated: Yes


Orders Last 24hrs: 


 Active Orders 24 hr











 Category Date Time Status


 


 Acetaminophen/HYDROcodone [Norco 325-5 MG] Med  01/04/18 17:10 Active





 1 tab PO Q4H PRN   


 


 Resuscitation Status Routine Resus Stat  01/04/18 16:39 Ordered








 Medication Orders





Hydrocodone Bitart/Acetaminophen (Norco 325-5 Mg)  1 tab PO Q4H PRN


   PRN Reason: Pain


   Last Admin: 01/04/18 17:17  Dose: 1 tab


Sodium Chloride (Saline Flush)  10 ml FLUSH ONETIME PRN


   PRN Reason: IV FLUSH


   Last Admin: 01/04/18 11:23  Dose: 10 ml


   Admin: 01/04/18 10:59  Dose: 10 ml








Assessment/Plan Comment:: 











Impression:





S/P fall with multiple rib fractures


Recent increase in Metoprolol, dizziness when taken on an empty stomach


A Fib on coumadin with out overt signs of hematoma











Chronic


CAD/PCI on Plavix


HTN


Macular Degeneration


Fibromyalgia


Hypothyroidism


Aortic Aneursym








Plan:





Gen Surg consult re: rib fx, follow as needed for PTX


Pain mgt narcotic/non-narcotics


PT/OT consults


CSM consult


Home meds


Daily meds


DVT/GI prophylaxis


Adjustment of medical regimen to avoid hypotension


Admit to obs with telemetry Epidermal Sutures: 5-0 Nylon

## 2018-07-13 ENCOUNTER — HOSPITAL ENCOUNTER (EMERGENCY)
Dept: HOSPITAL 41 - JD.ED | Age: 83
Discharge: HOME | End: 2018-07-13
Payer: MEDICARE

## 2018-07-13 VITALS — SYSTOLIC BLOOD PRESSURE: 112 MMHG | DIASTOLIC BLOOD PRESSURE: 85 MMHG

## 2018-07-13 DIAGNOSIS — Z88.5: ICD-10-CM

## 2018-07-13 DIAGNOSIS — Z79.899: ICD-10-CM

## 2018-07-13 DIAGNOSIS — Z88.2: ICD-10-CM

## 2018-07-13 DIAGNOSIS — F41.9: ICD-10-CM

## 2018-07-13 DIAGNOSIS — I48.0: Primary | ICD-10-CM

## 2018-07-13 DIAGNOSIS — Z79.01: ICD-10-CM

## 2018-07-13 DIAGNOSIS — F32.9: ICD-10-CM

## 2018-07-13 NOTE — EDM.PDOC
ED HPI GENERAL MEDICAL PROBLEM





- General


Chief Complaint: Cardiovascular Problem


Stated Complaint: [facility]


Time Seen by Provider: 07/13/18 04:38


Source of Information: Reports: Patient, Family ()


History Limitations: Reports: No Limitations





- History of Present Illness


INITIAL COMMENTS - FREE TEXT/NARRATIVE: 





The patient states that she developed left hand numbness, that extended up her 

entire left upper extremity into her left jaw as a shooting pain, sometime 

between 02:00 and 03:00 this morning. She states that she had similar pain in 

October 2017, was evaluated at [facility], was found to have an 

elevated troponin, and ultimately received 2 coronary stents. She was started 

on Plavix, which she still takes.





The patient states that she was found to have atrial fibrillation in December 2017. She was placed on Coumadin and Toprol-XL, initially 100 mg daily. The 

patient states that she gets her INR checked about every 2-3 weeks, with the 

most recent INR 2.4.





She states that she has been experiencing episodes of dizziness and wooziness. 

She states that she fell out of the shower and broke 5 ribs on 1/2/2018 due to 

such an episode of dizziness. She developed a hemothorax, requiring drainage. 

She states that during the drainage of the hemothorax, she experienced another 

episode of atrial fibrillation.





The patient states that Dr. Villa, her Cardiologist, discontinued her aspirin 

around April 2018. She is not sure why.





The patient states that she met with the EP Cardiologist, Dr. Bro, on one 

occasion, but that he did not suggest any treatments for the patient's atrial 

fibrillation, other than continuation of metoprolol.





There has been some concern that the patient's episodes of dizziness is related 

to her Toprol-XL, therefore it was decreased to 50 mg daily, then, to 25 mg, 

which she is currently taking. The patient's , however, upon discovering 

the patient's heart rate to be elevated this morning, presumed that she was in 

atrial fibrillation and gave her 50 mg of Toprol XL.





Here in the ED, the patient was initially found to be tachycardic and 

tachypneic. An initial ECG demonstrated atrial fibrillation with RVR. As I'm 

seeing her, she states that she feels back to normal.





The patient's PCP is Dr. Arvizu.








  ** Chest


Pain Score (Numeric/FACES): 3





- Related Data


 Allergies











Allergy/AdvReac Type Severity Reaction Status Date / Time


 


Sulfa (Sulfonamide Allergy  Cannot Verified 07/13/18 04:43





Antibiotics)   Remember  


 


codeine AdvReac  Disorientat Verified 07/13/18 04:43





   ion  











Home Meds: 


 Home Meds





Amitriptyline [Elavil] 12.5 mg PO BEDTIME 02/06/18 [History]


Clopidogrel [Plavix] 75 mg PO DAILY 02/06/18 [History]


Warfarin Sodium 5 mg PO SUTUTHSA 02/06/18 [History]


Warfarin Sodium 7.5 mg PO MOWEFR 02/06/18 [History]


atorvaSTATin [Lipitor] 10 mg PO DAILY 02/06/18 [History]


Alpha Lipoic Acid 200 mg PO DAILY 07/13/18 [History]


Ascorbic Acid [Vitamin C] 1,000 mg PO DAILY 07/13/18 [History]


Cinnamon Bark [Cinnamon] 1,000 mg PO DAILY 07/13/18 [History]


Citracal Petite 1 cap PO DAILY 07/13/18 [History]


Docusate Sodium [Colace] 100 mg PO ONCALL PRN 07/13/18 [History]


Fish Oil/DHA/EPA [Fish Oil 1,200 MG] 1,200 mg PO DAILY 07/13/18 [History]


L.acidoph,Paracasei, B.lactis [Probiotic] 1 cap PO DAILY 07/13/18 [History]


Levothyroxine [Synthroid] 88 mcg PO DAILY 07/13/18 [History]


Magnesium 250 mg PO DAILY 07/13/18 [History]


Metoprolol Succinate [Toprol XL] 25 mg PO DAILY 07/13/18 [History]


Multivits-Min/Iron/FA/Lutein [Centrum Silver Women Tablet] 1 tab PO DAILY 07/13/ 18 [History]


Ubidecarenone [Co Q-10] 100 mg PO DAILY 07/13/18 [History]


Vit A/Vit C/Vit E/Zinc/Copper [Icaps Areds Formula] 1 cap PO DAILY 07/13/18 [

History]











Past Medical History


HEENT History: Reports: Macular Degeneration


Cardiovascular History: Reports: Afib (paroxysmal), Aneurysm (AAA, repaired), 

CAD, High Cholesterol, MI


Genitourinary History: Reports: Urinary Incontinence


OB/GYN History: Reports: Pregnancy (x 3)


Musculoskeletal History: Reports: Arthritis, Fracture (5 left ribs)


Psychiatric History: Reports: Anxiety, Depression, Other (See Below) (

Fibromyalgia)


Endocrine/Metabolic History: Reports: Hypothyroidism





- Infectious Disease History


Infectious Disease History: Reports: Chicken Pox





- Past Surgical History


HEENT Surgical History: Reports: Cataract Surgery


Cardiovascular Surgical History: Reports: Coronary Artery Stent (x 2, Oct 2017)

, Vascular Surgery (repair of AAA)


GI Surgical History: Reports: Cholecystectomy


Female  Surgical History: Reports: Breast Implant (bilateral), Hysterectomy, 

Salpingo-Oophorectomy, Other (See Below) (Bladder suspension)





Social & Family History





- Family History


Family Medical History: Noncontributory





- Tobacco Use


Smoking Status *Q: Never Smoker





- Caffeine Use


Caffeine Use: Reports: None





- Recreational Drug Use


Recreational Drug Use: No





ED ROS GENERAL





- Review of Systems


Review Of Systems: ROS reveals no pertinent complaints other than HPI.





ED EXAM, GENERAL





- Physical Exam


Exam: See Below


Exam Limited By: No Limitations


General Appearance: Alert, WD/WN, No Apparent Distress, Anxious


Eye Exam: Bilateral Eye: EOMI, Normal Inspection


Ears: Normal External Exam, Hearing Grossly Normal


Nose: Normal Inspection, No Blood


Throat/Mouth: Normal Inspection, Normal Lips, Normal Voice, No Airway Compromise


Head: Atraumatic, Normocephalic


Neck: Normal Inspection, Full Range of Motion


Respiratory/Chest: No Respiratory Distress, Lungs Clear, Normal Breath Sounds, 

No Accessory Muscle Use


Cardiovascular: Normal Peripheral Pulses, No Gallop, No JVD, No Murmur, No Rub, 

Tachycardia (Regular. Looks like NSR on telemetry monitor.)


Peripheral Pulses: 4+: Radial (L), Radial (R)


GI/Abdominal: Normal Bowel Sounds, Soft, Non-Tender, No Organomegaly, No 

Distention, No Abnormal Bruit, No Mass


 (Female) Exam: Deferred


Rectal (Female) Exam: Deferred


Back Exam: Normal Inspection, Full Range of Motion, NT


Extremities: Normal Inspection, Normal Range of Motion, No Pedal Edema, Normal 

Capillary Refill


Neurological: Alert, Oriented, Normal Cognition, No Motor/Sensory Deficits


Psychiatric: Normal Affect


Skin Exam: Warm, Dry, Intact, Normal Color, No Rash





EKG INTERPRETATION


EKG Date: 07/13/18


Time: 04:44


Rhythm: A-Fib


Rate (Beats/Min): 130


Axis: Normal


P-Wave: Absent


QRS: Normal


ST-T: Normal


QT: Prolonged (QTc 486 ms)


Comparison: Change From Previous EKG (1/9/2018 - A-fib w/ RVR, unchanged, but 2/ 6/2018 - NSR)





Course





- Vital Signs


Last Recorded V/S: 


 Last Vital Signs











Temp  36.4 C   07/13/18 04:21


 


Pulse  132 H  07/13/18 04:21


 


Resp  29 H  07/13/18 04:21


 


BP  112/85   07/13/18 04:21


 


Pulse Ox  94 L  07/13/18 04:21














- Re-Assessments/Exams


Free Text/Narrative Re-Assessment/Exam: 





07/13/18 06:04


During my evaluation of the patient, I noticed the patient's monitor appeared 

to demonstrate a sinus rhythm. I ordered a second ECG, which demonstrates a 

normal sinus rhythm at 62 bpm. There are no acute ST or T-wave changes. No 

intraventricular conduction delays. No LAD. No LVH. QTc is within normal limits.





The patient feels well. No further workup is needed today.





Going forward, the patient and her  were asking me what my opinion was 

with respect to her atrial fibrillation. I explained that she needs to be on an 

anticoagulant such as Coumadin, likely for life, to prevent a mural thrombus 

and stroke, and that she needs to be on Plavix for 6 months to one year after 

the placement of her cardiac stents, to prevent stent thrombosis. I explained 

the distinction between the two anticoagulants. We discussed potential options 

for the treatment of atrial fibrillation, including left atrial appendage 

closure, antidysrhythmic medications, and even ablation, however, I explained 

that the patient may have numerous contraindications to some or all of these 

options. Only Dr. Villa or Dr. Bro can answer those questions.





With respect to the patient's metoprolol and her dizziness, she states that her 

dizziness is episodic, and that it comes out of the blue. If that is true, then 

it is not likely the Toprol XL that is causing her dizziness. I'm curious about 

whether or not the patient may have BPPV, although in order to make that 

diagnosis, the patient has to be symptomatic in front of the diagnostician. Her 

Toprol XL was reduced from 100 mg daily down to 25 mg daily, and while 

metoprolol is not an antidysrhythmic, per se, a higher dose may help to keep 

her out of atrial fibrillation, therefore it would be better if the patietn can 

take a higher dose, but not if the Toprol XL is infact contributing to her 

dizziness, as this has caused her serious harm, including falling, breaking ribs

, and a hemothorax requiring drainage. I believe it is important to first 

determine what is the cause of her dizziness, address that, then deal with 

controlling her atrial fibrillation. This was discussed at length, and I 

believe that both the patient and her  have a better understanding of 

this complicated topic.





Departure





- Departure


Time of Disposition: 06:10


Disposition: Home, Self-Care 01


Condition: Good


Clinical Impression: 


 Paroxysmal atrial fibrillation





Instructions:  Atrial Fibrillation, Easy-to-Read


Referrals: 


Ryne Arvizu MD [Primary Care Provider] - 


Forms:  ED Department Discharge


Additional Instructions: 


You were seen in the emergency room for severe left hand, upper extremity, and 

jaw, associated with a rapid heartbeat.





Workup in the ER included an ECG, which confirmed that you were in atrial 

fibrillation with a rapid ventricular response, however, you spontaneously 

converted to normal sinus rhythm in the ER. No further workup was needed.





Continue to take your currently prescribed medications, including your Coumadin

, Plavix, and metoprolol.





Follow-up with your PCP, Dr. Arvizu, to continue to explore the cause of your 

dizziness.





If any other problems, please do not hesitate to return to the ER.

## 2019-01-13 ENCOUNTER — HOSPITAL ENCOUNTER (EMERGENCY)
Dept: HOSPITAL 41 - JD.ED | Age: 84
Discharge: HOME | End: 2019-01-13
Payer: MEDICARE

## 2019-01-13 DIAGNOSIS — Z98.890: ICD-10-CM

## 2019-01-13 DIAGNOSIS — F32.9: ICD-10-CM

## 2019-01-13 DIAGNOSIS — I48.2: Primary | ICD-10-CM

## 2019-01-13 DIAGNOSIS — Z79.82: ICD-10-CM

## 2019-01-13 DIAGNOSIS — E78.00: ICD-10-CM

## 2019-01-13 DIAGNOSIS — Z90.49: ICD-10-CM

## 2019-01-13 DIAGNOSIS — I25.2: ICD-10-CM

## 2019-01-13 DIAGNOSIS — E03.9: ICD-10-CM

## 2019-01-13 DIAGNOSIS — I25.10: ICD-10-CM

## 2019-01-13 DIAGNOSIS — Z79.01: ICD-10-CM

## 2019-01-13 DIAGNOSIS — Z91.048: ICD-10-CM

## 2019-01-13 DIAGNOSIS — Z98.61: ICD-10-CM

## 2019-01-13 DIAGNOSIS — Z79.899: ICD-10-CM

## 2019-01-13 DIAGNOSIS — Z88.5: ICD-10-CM

## 2019-01-13 DIAGNOSIS — Z90.712: ICD-10-CM

## 2019-01-13 DIAGNOSIS — Z88.2: ICD-10-CM

## 2019-01-13 PROCEDURE — 96375 TX/PRO/DX INJ NEW DRUG ADDON: CPT

## 2019-01-13 PROCEDURE — 93005 ELECTROCARDIOGRAM TRACING: CPT

## 2019-01-13 PROCEDURE — 99285 EMERGENCY DEPT VISIT HI MDM: CPT

## 2019-01-13 PROCEDURE — 96365 THER/PROPH/DIAG IV INF INIT: CPT

## 2019-01-13 PROCEDURE — 80053 COMPREHEN METABOLIC PANEL: CPT

## 2019-01-13 PROCEDURE — 85025 COMPLETE CBC W/AUTO DIFF WBC: CPT

## 2019-01-13 PROCEDURE — 96366 THER/PROPH/DIAG IV INF ADDON: CPT

## 2019-01-13 PROCEDURE — 36415 COLL VENOUS BLD VENIPUNCTURE: CPT

## 2019-01-13 PROCEDURE — 84484 ASSAY OF TROPONIN QUANT: CPT

## 2019-01-13 PROCEDURE — 71045 X-RAY EXAM CHEST 1 VIEW: CPT

## 2019-01-13 NOTE — CR
Chest: Portable view of the chest was obtained.

 

Comparison: Prior chest x-ray of 11/18/18.

 

Calcified breast prosthesis is incidentally noted.  Heart is mildly 

enlarged.  Pulmonary vessels are slightly increased which appear to be

 chronic.  No acute parenchymal change is seen.  Previous sternotomy 

is noted.  Bony structures are grossly intact.

 

Impression:

1.  Cardiomegaly and mild chronic pulmonary vascular congestion.

2.  Other incidental findings.

 

Diagnostic code #3

## 2019-01-13 NOTE — EDM.PDOC
ED HPI GENERAL MEDICAL PROBLEM





- General


Chief Complaint: Cardiovascular Problem


Stated Complaint: A FIB


Time Seen by Provider: 01/13/19 02:49


Source of Information: Reports: Patient, Family


History Limitations: Reports: No Limitations





- History of Present Illness


INITIAL COMMENTS - FREE TEXT/NARRATIVE: 





The patient presents with A-fib recurrence.  The patient has a history of A-

fib.  She goes in and out of it.  She sees Dr Arvizu and Dr Grace.  She has been 

doing good on the meds she is on.  She was putting away [place] decorations 

yesterday.  She did a lot of work.  She went to bed and developed a cough and 

took robitussin PM.  This morning she woke up with discomfort in her arm.  That 

is usually how it starts and then she felt palpitations.  She has no chest pain 

or shortness of breath.  She has no fever, chillls, cough, congestion or runny 

nose.  She was on an antibiotic for C-dif infection until a few days ago.


Onset: Sudden


Duration: Hour(s):


Location: Reports: Upper Extremity, Left (discomfort)


Severity: Mild


Improves with: Reports: None


Worsens with: Reports: None


Associated Symptoms: Reports: No Other Symptoms





- Related Data


 Allergies











Allergy/AdvReac Type Severity Reaction Status Date / Time


 


Sulfa (Sulfonamide Allergy  Cannot Verified 01/13/19 02:40





Antibiotics)   Remember  


 


codeine AdvReac  Disorientat Verified 01/13/19 02:40





   ion  











Home Meds: 


 Home Meds





atorvaSTATin [Lipitor] 10 mg PO DAILY 02/06/18 [History]


Citracal Petite 1 cap PO DAILY 07/13/18 [History]


Docusate Sodium [Colace] 100 mg PO ONCALL PRN 07/13/18 [History]


L.acidoph,Paracasei, B.lactis [Probiotic] 1 cap PO DAILY 07/13/18 [History]


Levothyroxine [Synthroid] 88 mcg PO DAILY 07/13/18 [History]


Metoprolol Succinate [Toprol XL] 12.5 mg PO BID 07/13/18 [History]


Multivits-Min/Iron/FA/Lutein [Centrum Silver Women Tablet] 1 tab PO DAILY 07/13/ 18 [History]


Ubidecarenone [Co Q-10] 100 mg PO DAILY 07/13/18 [History]


Vit A/Vit C/Vit E/Zinc/Copper [Icaps Areds Formula] 1 cap PO BID 07/13/18 [

History]


DULoxetine [Cymbalta] 1 cap PO BID 11/11/18 [History]


Aspirin 81 mg PO DAILY 01/13/19 [History]


Rivaroxaban [Xarelto] 20 mg PO DAILY 01/13/19 [History]











Past Medical History


HEENT History: Reports: Macular Degeneration


Cardiovascular History: Reports: Afib, Aneurysm, CAD, High Cholesterol, MI


Other Cardiovascular History: resolved after surgery; end of october of last 

year


Respiratory History: Reports: Other (See Below)


Other Respiratory History: rib fx


Other Gastrointestinal History: during surgery intestine punctured


Genitourinary History: Reports: Urinary Incontinence


OB/GYN History: Reports: Pregnancy


Other OB/GYN History: x3


Musculoskeletal History: Reports: Arthritis, Fracture


Other Musculoskeletal History: hx of Fractured left side rib


Neurological History: Reports: None


Psychiatric History: Reports: Anxiety, Depression


Endocrine/Metabolic History: Reports: Hypothyroidism


Hematologic History: Reports: None


Immunologic History: Reports: None


Oncologic (Cancer) History: Reports: None


Dermatologic History: Reports: None





- Infectious Disease History


Infectious Disease History: Reports: Chicken Pox





- Past Surgical History


Head Surgeries/Procedures: Reports: None


HEENT Surgical History: Reports: Cataract Surgery


Cardiovascular Surgical History: Reports: Coronary Artery Stent, Vascular 

Surgery, Other (See Below)


Other Cardiovascular Surgeries/Procedures: angiogram last week 11/12/18


GI Surgical History: Reports: Cholecystectomy


Female  Surgical History: Reports: Breast Implant, Hysterectomy, Salpingo-

Oophorectomy, Other (See Below)


Neurological Surgical History: Reports: None





Social & Family History





- Family History


Family Medical History: Noncontributory





- Tobacco Use


Smoking Status *Q: Never Smoker





- Caffeine Use


Caffeine Use: Reports: None





- Recreational Drug Use


Recreational Drug Use: No





ED ROS GENERAL





- Review of Systems


Review Of Systems: See Below


Constitutional: Reports: No Symptoms


HEENT: Reports: No Symptoms


Respiratory: Reports: No Symptoms


Cardiovascular: Reports: Palpitations.  Denies: Chest Pain


Endocrine: Reports: No Symptoms


GI/Abdominal: Reports: No Symptoms


: Reports: No Symptoms


Musculoskeletal: Reports: Other (Left arm discomofort)





ED EXAM, GENERAL





- Physical Exam


Exam: See Below


Exam Limited By: No Limitations


General Appearance: Alert, No Apparent Distress


Ears: Normal External Exam


Nose: Normal Inspection


Head: Atraumatic, Normocephalic


Neck: Normal Inspection


Respiratory/Chest: No Respiratory Distress, Lungs Clear, Normal Breath Sounds


Cardiovascular: No Edema, No Murmur, Tachycardia, Irregularly Irregular





EKG INTERPRETATION


EKG Date: 01/13/19


Time: 02:40


Rhythm: A-Fib


Rate (Beats/Min): 133


Axis: Normal


QRS: Normal


ST-T: Normal


QT: Normal





Course





- Vital Signs


Last Recorded V/S: 


 Last Vital Signs











Temp  97.9 F   01/13/19 02:38


 


Pulse  134 H  01/13/19 02:38


 


Resp  16   01/13/19 02:38


 


BP      


 


Pulse Ox  94 L  01/13/19 02:38














- Orders/Labs/Meds


Orders: 


 Active Orders 24 hr











 Category Date Time Status


 


 Cardiac Monitoring [RC] .AS DIRECTED Care  01/13/19 02:56 Active


 


 EKG Documentation Completion [RC] ASDIRECTED Care  01/13/19 02:42 Active


 


 Peripheral IV Care [RC] .AS DIRECTED Care  01/13/19 02:57 Active


 


 Chest 1V Frontal [CR] Stat Exams  01/13/19 02:57 Taken


 


 Diltiazem 125 mg Med  01/13/19 03:00 Active





 Sodium Chloride 0.9% [Normal Saline] 100 ml   





 IV TITRATE   


 


 Sodium Chloride 0.9% [Normal Saline] 500 ml Med  01/13/19 03:00 Active





 IV .BOLUS   


 


 Sodium Chloride 0.9% [Saline Flush] Med  01/13/19 02:56 Active





 10 ml FLUSH ASDIRECTED PRN   


 


 Peripheral IV Insertion Adult [OM.PC] Stat Oth  01/13/19 02:56 Ordered


 


 EKG 12 Lead [EK] Stat Ther  01/13/19 02:42 Ordered








 Medication Orders





Sodium Chloride (Normal Saline)  500 mls @ 1,000 mls/hr IV .BOLUS ROSHNI


   Last Admin: 01/13/19 03:05  Dose: 1,000 mls/hr


Diltiazem HCl 125 mg/ Sodium (Chloride)  125 mls @ 5 mls/hr IV TITRATE ROSHNI; 

Protocol


   Last Admin: 01/13/19 03:29  Dose: 5 mg/hr, 5 mls/hr


Sodium Chloride (Saline Flush)  10 ml FLUSH ASDIRECTED PRN


   PRN Reason: Keep Vein Open


   Last Admin: 01/13/19 03:07  Dose: 10 ml








Labs: 


 Laboratory Tests











  01/13/19 01/13/19 Range/Units





  02:40 02:40 


 


WBC  10.37 H   (3.98-10.04)  K/mm3


 


RBC  5.18   (3.98-5.22)  M/mm3


 


Hgb  15.8 H   (11.2-15.7)  gm/L


 


Hct  47.9 H   (34.1-44.9)  %


 


MCV  92.5   (79.4-94.8)  fl


 


MCH  30.5   (25.6-32.2)  pg


 


MCHC  33.0   (32.2-35.5)  g/dl


 


RDW Std Deviation  47.8 H   (36.4-46.3)  fL


 


Plt Count  285   (182-369)  K/mm3


 


MPV  9.9   (9.4-12.3)  fl


 


Neut % (Auto)  49.2   (34.0-71.1)  %


 


Lymph % (Auto)  34.8   (19.3-51.7)  %


 


Mono % (Auto)  12.6 H   (4.7-12.5)  %


 


Eos % (Auto)  2.3   (0.7-5.8)  


 


Baso % (Auto)  0.7   (0.1-1.2)  %


 


Neut # (Auto)  5.10   (1.56-6.13)  K/mm3


 


Lymph # (Auto)  3.61   (1.18-3.74)  K/mm3


 


Mono # (Auto)  1.31 H   (0.24-0.36)  K/mm3


 


Eos # (Auto)  0.24   (0.04-0.36)  K/mm3


 


Baso # (Auto)  0.07   (0.01-0.08)  K/mm3


 


Sodium   145  (136-145)  mEq/L


 


Potassium   3.7  (3.5-5.1)  mEq/L


 


Chloride   107  ()  mEq/L


 


Carbon Dioxide   28  (21-32)  mEq/L


 


Anion Gap   13.7  (5-15)  


 


BUN   18  (7-18)  mg/dL


 


Creatinine   1.0  (0.55-1.02)  mg/dL


 


Est Cr Clr Drug Dosing   41.45  mL/min


 


Estimated GFR (MDRD)   53  (>60)  mL/min


 


BUN/Creatinine Ratio   18.0  (14-18)  


 


Glucose   97  ()  mg/dL


 


Calcium   9.0  (8.5-10.1)  mg/dL


 


Total Bilirubin   1.1 H  (0.2-1.0)  mg/dL


 


AST   25  (15-37)  U/L


 


ALT   33  (14-59)  U/L


 


Alkaline Phosphatase   101  ()  U/L


 


Troponin I   < 0.017  (0.00-0.056)  ng/mL


 


Total Protein   7.7  (6.4-8.2)  g/dl


 


Albumin   3.8  (3.4-5.0)  g/dl


 


Globulin   3.9  gm/dL


 


Albumin/Globulin Ratio   1.0  (1-2)  











Meds: 


Medications











Generic Name Dose Route Start Last Admin





  Trade Name Freq  PRN Reason Stop Dose Admin


 


Sodium Chloride  500 mls @ 1,000 mls/hr  01/13/19 03:00  01/13/19 03:05





  Normal Saline  IV   1,000 mls/hr





  .BOLUS ROSHNI   Administration





     





     





     





     


 


Diltiazem HCl 125 mg/ Sodium  125 mls @ 5 mls/hr  01/13/19 03:00  01/13/19 03:29





  Chloride  IV   5 mg/hr





  TITRATE ROSHNI   5 mls/hr





     Administration





     





  Protocol   





  5 MG/HR   


 


Sodium Chloride  10 ml  01/13/19 02:56  01/13/19 03:07





  Saline Flush  FLUSH   10 ml





  ASDIRECTED PRN   Administration





  Keep Vein Open   





     





     





     














Discontinued Medications














Generic Name Dose Route Start Last Admin





  Trade Name Freq  PRN Reason Stop Dose Admin


 


Diltiazem HCl  10 mg  01/13/19 02:58  01/13/19 03:10





  Cardizem  IVPUSH  01/13/19 02:59  10 mg





  ONETIME ONE   Administration





     





     





     





     














- Re-Assessments/Exams


Free Text/Narrative Re-Assessment/Exam: 





01/13/19 03:50


I ordered an IV NS 500mL bolus, cardizem bolus of 10mg IV, cardizem drip at 5mg 

IV, labs, EKG and a CXR.  Her EKG shows A-fib at a rate of 133.


01/13/19 05:32


Her CXR looks good.  Her WBC was a little elevated at 10.37.  Her Hgb was a 

little elevated at 15.8.  Her CMP is negative.  Her troponin was normal.  Her 

BP went down to I had to order another bolus.  That helped and she was able to 

get the cardizem bolus and drip and she converted.  She feels better and would 

like to go home.  I will not make any changes to her meds yet.  They should 

contact Dr Arvizu.





Departure





- Departure


Time of Disposition: 05:35


Disposition: Home, Self-Care 01


Condition: Good


Clinical Impression: 


 Atrial fibrillation with RVR





Referrals: 


Ryne Arvizu MD [Primary Care Provider] - 1 Week


Forms:  ED Department Discharge


Additional Instructions: 


Take your medication as prescribed.  Follow up with Dr Arvizu within a week.  

Please return if you are worse.





- My Orders


Last 24 Hours: 


My Active Orders





01/13/19 02:42


EKG Documentation Completion [RC] ASDIRECTED 


EKG 12 Lead [EK] Stat 





01/13/19 02:56


Cardiac Monitoring [RC] .AS DIRECTED 


Sodium Chloride 0.9% [Saline Flush]   10 ml FLUSH ASDIRECTED PRN 


Peripheral IV Insertion Adult [OM.PC] Stat 





01/13/19 02:57


Peripheral IV Care [RC] .AS DIRECTED 


Chest 1V Frontal [CR] Stat 





01/13/19 03:00


Diltiazem 125 mg   Sodium Chloride 0.9% [Normal Saline] 100 ml IV TITRATE 


Sodium Chloride 0.9% [Normal Saline] 500 ml IV .BOLUS 














- Assessment/Plan


Last 24 Hours: 


My Active Orders





01/13/19 02:42


EKG Documentation Completion [RC] ASDIRECTED 


EKG 12 Lead [EK] Stat 





01/13/19 02:56


Cardiac Monitoring [RC] .AS DIRECTED 


Sodium Chloride 0.9% [Saline Flush]   10 ml FLUSH ASDIRECTED PRN 


Peripheral IV Insertion Adult [OM.PC] Stat 





01/13/19 02:57


Peripheral IV Care [RC] .AS DIRECTED 


Chest 1V Frontal [CR] Stat 





01/13/19 03:00


Diltiazem 125 mg   Sodium Chloride 0.9% [Normal Saline] 100 ml IV TITRATE 


Sodium Chloride 0.9% [Normal Saline] 500 ml IV .BOLUS

## 2019-04-14 ENCOUNTER — HOSPITAL ENCOUNTER (EMERGENCY)
Dept: HOSPITAL 41 - JD.ED | Age: 84
Discharge: HOME | End: 2019-04-14
Payer: MEDICARE

## 2019-04-14 VITALS — SYSTOLIC BLOOD PRESSURE: 119 MMHG | DIASTOLIC BLOOD PRESSURE: 72 MMHG

## 2019-04-14 DIAGNOSIS — E78.00: ICD-10-CM

## 2019-04-14 DIAGNOSIS — Z79.899: ICD-10-CM

## 2019-04-14 DIAGNOSIS — F32.9: ICD-10-CM

## 2019-04-14 DIAGNOSIS — Z79.82: ICD-10-CM

## 2019-04-14 DIAGNOSIS — I48.91: Primary | ICD-10-CM

## 2019-04-14 DIAGNOSIS — Z88.5: ICD-10-CM

## 2019-04-14 DIAGNOSIS — I25.2: ICD-10-CM

## 2019-04-14 DIAGNOSIS — E03.9: ICD-10-CM

## 2019-04-14 DIAGNOSIS — I25.10: ICD-10-CM

## 2019-04-14 DIAGNOSIS — F41.9: ICD-10-CM

## 2019-04-14 PROCEDURE — 93005 ELECTROCARDIOGRAM TRACING: CPT

## 2019-04-14 PROCEDURE — 96365 THER/PROPH/DIAG IV INF INIT: CPT

## 2019-04-14 PROCEDURE — 99285 EMERGENCY DEPT VISIT HI MDM: CPT

## 2019-04-14 PROCEDURE — 83735 ASSAY OF MAGNESIUM: CPT

## 2019-04-14 PROCEDURE — 84484 ASSAY OF TROPONIN QUANT: CPT

## 2019-04-14 PROCEDURE — 85025 COMPLETE CBC W/AUTO DIFF WBC: CPT

## 2019-04-14 PROCEDURE — 96361 HYDRATE IV INFUSION ADD-ON: CPT

## 2019-04-14 PROCEDURE — 36415 COLL VENOUS BLD VENIPUNCTURE: CPT

## 2019-04-14 PROCEDURE — 80053 COMPREHEN METABOLIC PANEL: CPT

## 2019-04-14 NOTE — EDM.PDOC
ED HPI GENERAL MEDICAL PROBLEM





- General


Chief Complaint: Cardiovascular Problem


Stated Complaint: [facility]


Time Seen by Provider: 04/14/19 03:57


Source of Information: Reports: Patient


History Limitations: Reports: No Limitations





- History of Present Illness


INITIAL COMMENTS - FREE TEXT/NARRATIVE: 





This is an 83-year-old  female. She has been seen multiple times in 

the ER for acute onset of atrial fibrillation. Apparently this morning around 2 

AM she noted the typical stinging in her left wrist upper left arm and mild 

discomfort in the left chest with palpitations. She didn't feel well and she 

realized she was in atrial fibrillation so she called the ambulance and she 

comes to the ER. She took an extra dose of metoprolol prior to coming to the 

ER. She denies any shortness of breath she denies any sweating and no 

significant chest pain. She really hasn't done any different activity or extra 

activity over the last few days to suggest an etiology for the atrial 

fibrillation to start.





- Related Data


 Allergies











Allergy/AdvReac Type Severity Reaction Status Date / Time


 


codeine AdvReac  Disorientat Verified 04/14/19 03:51





   ion  











Home Meds: 


 Home Meds





atorvaSTATin [Lipitor] 10 mg PO DAILY 02/06/18 [History]


L.acidoph,Paracasei, B.lactis [Probiotic] 1 cap PO DAILY 07/13/18 [History]


Levothyroxine [Synthroid] 88 mcg PO DAILY 07/13/18 [History]


Metoprolol Succinate [Toprol XL] 12.5 mg PO BID 07/13/18 [History]


Ubidecarenone [Co Q-10] 100 mg PO DAILY 07/13/18 [History]


Vit A/Vit C/Vit E/Zinc/Copper [Icaps Areds Formula] 1 cap PO BID 07/13/18 [

History]


DULoxetine [Cymbalta] 1 cap PO BID 11/11/18 [History]


Aspirin 81 mg PO DAILY 01/13/19 [History]


Apixaban [Eliquis] 5 mg PO BID 04/14/19 [History]


Cholecalciferol (Vitamin D3) [Vitamin D] 1,000 unit PO DAILY 04/14/19 [History]











Past Medical History


HEENT History: Reports: Macular Degeneration


Cardiovascular History: Reports: Afib, Aneurysm, CAD, High Cholesterol, MI


Other Cardiovascular History: resolved after surgery; end of october of last 

year


Respiratory History: Reports: Other (See Below)


Other Respiratory History: rib fx


Other Gastrointestinal History: during surgery intestine punctured


Genitourinary History: Reports: Urinary Incontinence


OB/GYN History: Reports: Pregnancy


Other OB/GYN History: x3


Musculoskeletal History: Reports: Arthritis, Fracture


Other Musculoskeletal History: hx of Fractured left side rib


Neurological History: Reports: None


Psychiatric History: Reports: Anxiety, Depression


Endocrine/Metabolic History: Reports: Hypothyroidism


Hematologic History: Reports: None


Immunologic History: Reports: None


Oncologic (Cancer) History: Reports: None


Dermatologic History: Reports: None





- Infectious Disease History


Infectious Disease History: Reports: Chicken Pox





- Past Surgical History


Head Surgeries/Procedures: Reports: None


HEENT Surgical History: Reports: Cataract Surgery


Cardiovascular Surgical History: Reports: Coronary Artery Stent, Vascular 

Surgery, Other (See Below)


Other Cardiovascular Surgeries/Procedures: angiogram last week 11/12/18


GI Surgical History: Reports: Cholecystectomy


Female  Surgical History: Reports: Breast Implant, Hysterectomy, Salpingo-

Oophorectomy, Other (See Below)


Neurological Surgical History: Reports: None





Social & Family History





- Family History


Family Medical History: Noncontributory





- Caffeine Use


Caffeine Use: Reports: None





ED ROS GENERAL





- Review of Systems


Review Of Systems: See Below


Constitutional: Denies: Fever, Chills


HEENT: Reports: No Symptoms


Respiratory: Denies: Shortness of Breath, Cough


Cardiovascular: Denies: Chest Pain, Dyspnea on Exertion, Edema


Endocrine: Reports: No Symptoms


GI/Abdominal: Denies: Abdominal Pain, Nausea, Vomiting


: Reports: No Symptoms


Musculoskeletal: Reports: Other (As per history of present illness)


Skin: Reports: No Symptoms


Neurological: Reports: No Symptoms


Psychiatric: Reports: No Symptoms


Hematologic/Lymphatic: Reports: No Symptoms





ED EXAM, GENERAL





- Physical Exam


Exam: See Below


Exam Limited By: No Limitations


General Appearance: Alert, WD/WN, No Apparent Distress


Eye Exam: Bilateral Eye: Normal Inspection


Ears: Normal External Exam


Nose: Normal Inspection


Throat/Mouth: Normal Inspection, Normal Lips, Normal Voice, No Airway Compromise


Head: Normocephalic


Neck: Supple


Respiratory/Chest: No Respiratory Distress, Lungs Clear, Normal Breath Sounds


Cardiovascular: No Murmur, Tachycardia, Irregularly Irregular


GI/Abdominal: Soft


Back Exam: Decreased Range of Motion


Extremities: Normal Inspection, Normal Range of Motion.  No: Pedal Edema


Neurological: Alert, Oriented


Psychiatric: Normal Affect, Normal Mood


Skin Exam: Warm, Dry





EKG INTERPRETATION


EKG Date: 04/14/19


Time: 03:48


EKG Interpretation Comments: 





EKG shows a atrial fibrillation with a rapid ventricular response rate of 116, 

there is no acute ST or T-wave changes noted and there is no ischemia noted








4/14/2019  05:21


Second EKG shows a normal sinus rhythm rate of about 53, there are no acute ST 

or T-wave changes and no ischemia noted.





Course





- Vital Signs


Last Recorded V/S: 


 Last Vital Signs











Temp  98.5 F   04/14/19 03:51


 


Pulse  125 H  04/14/19 03:51


 


Resp  18   04/14/19 03:51


 


BP  119/72   04/14/19 03:51


 


Pulse Ox  99   04/14/19 03:51














- Orders/Labs/Meds


Orders: 


 Active Orders 24 hr











 Category Date Time Status


 


 Diltiazem 125 mg Med  04/14/19 04:00 Active





 Sodium Chloride 0.9% [Normal Saline] 100 ml   





 IV TITRATE   


 


 Sodium Chloride 0.9% [Normal Saline] 1,000 ml Med  04/14/19 04:15 Active





 IV ASDIRECTED   








 Medication Orders





Diltiazem HCl 125 mg/ Sodium (Chloride)  125 mls @ 5 mls/hr IV TITRATE ROSHNI; 

Protocol


   Last Admin: 04/14/19 04:27  Dose: 5 mg/hr, 5 mls/hr


Sodium Chloride (Normal Saline)  1,000 mls @ 1,000 mls/hr IV ASDIRECTED ROSHNI


   Last Admin: 04/14/19 04:29  Dose: 1,000 mls/hr








Labs: 


 Laboratory Tests











  04/14/19 04/14/19 Range/Units





  04:03 04:03 


 


WBC  10.69 H   (3.98-10.04)  K/mm3


 


RBC  4.66   (3.98-5.22)  M/mm3


 


Hgb  14.2   (11.2-15.7)  gm/L


 


Hct  42.9   (34.1-44.9)  %


 


MCV  92.1   (79.4-94.8)  fl


 


MCH  30.5   (25.6-32.2)  pg


 


MCHC  33.1   (32.2-35.5)  g/dl


 


RDW Std Deviation  45.2   (36.4-46.3)  fL


 


Plt Count  229   (182-369)  K/mm3


 


MPV  9.9   (9.4-12.3)  fl


 


Neut % (Auto)  52.7   (34.0-71.1)  %


 


Lymph % (Auto)  31.2   (19.3-51.7)  %


 


Mono % (Auto)  13.8 H   (4.7-12.5)  %


 


Eos % (Auto)  1.7   (0.7-5.8)  


 


Baso % (Auto)  0.4   (0.1-1.2)  %


 


Neut # (Auto)  5.65   (1.56-6.13)  K/mm3


 


Lymph # (Auto)  3.33   (1.18-3.74)  K/mm3


 


Mono # (Auto)  1.47 H   (0.24-0.36)  K/mm3


 


Eos # (Auto)  0.18   (0.04-0.36)  K/mm3


 


Baso # (Auto)  0.04   (0.01-0.08)  K/mm3


 


Sodium   143  (136-145)  mEq/L


 


Potassium   3.8  (3.5-5.1)  mEq/L


 


Chloride   107  ()  mEq/L


 


Carbon Dioxide   31  (21-32)  mEq/L


 


Anion Gap   8.8  (5-15)  


 


BUN   20 H  (7-18)  mg/dL


 


Creatinine   0.9  (0.55-1.02)  mg/dL


 


Est Cr Clr Drug Dosing   44.34  mL/min


 


Estimated GFR (MDRD)   60  (>60)  mL/min


 


BUN/Creatinine Ratio   22.2 H  (14-18)  


 


Glucose   89  ()  mg/dL


 


Calcium   8.8  (8.5-10.1)  mg/dL


 


Magnesium   1.9  (1.8-2.4)  mg/dl


 


Total Bilirubin   1.3 H  (0.2-1.0)  mg/dL


 


AST   21  (15-37)  U/L


 


ALT   13 L  (14-59)  U/L


 


Alkaline Phosphatase   90  ()  U/L


 


Troponin I   < 0.017  (0.00-0.056)  ng/mL


 


Total Protein   6.8  (6.4-8.2)  g/dl


 


Albumin   3.4  (3.4-5.0)  g/dl


 


Globulin   3.4  gm/dL


 


Albumin/Globulin Ratio   1.0  (1-2)  











Meds: 


Medications











Generic Name Dose Route Start Last Admin





  Trade Name Freq  PRN Reason Stop Dose Admin


 


Diltiazem HCl 125 mg/ Sodium  125 mls @ 5 mls/hr  04/14/19 04:00  04/14/19 04:27





  Chloride  IV   5 mg/hr





  TITRATE ROSHNI   5 mls/hr





     Administration





     





  Protocol   





  5 MG/HR   


 


Sodium Chloride  1,000 mls @ 1,000 mls/hr  04/14/19 04:15  04/14/19 04:29





  Normal Saline  IV   1,000 mls/hr





  ASDIRECTED ROSHNI   Administration





     





     





     





     














Discontinued Medications














Generic Name Dose Route Start Last Admin





  Trade Name Freq  PRN Reason Stop Dose Admin


 


Diltiazem HCl  10 mg  04/14/19 04:00  04/14/19 04:10





  Cardizem  IVPUSH  04/14/19 04:01  10 mg





  ONETIME ONE   Administration





     





     





     





     














- Re-Assessments/Exams


Free Text/Narrative Re-Assessment/Exam: 





04/14/19 05:15


She got here the EKG showed atrial fibrillation with a fast ventricular 

response rate of about 116 on normal monitor she was 120 to 1:30. I gave her 

diltiazem 10 mg IV and put her on a 5 mg drip within about 60 minutes she 

converted to a normal sinus rhythm. We left a diltiazem drip running for about 

15 minutes and stopped it. During the time she is on the drip for blood 

pressure systolic stays in the 90s and once we stopped the drip it comes back 

up to 115 I did give her some fluids during this time as well of about 1000 mL. 

She is tolerated this well. She remains in a normal sinus rhythm and I will 

discharge her to follow-up with her doctor this week.


04/14/19 05:17


All the patient's blood work was essentially within normal limits.


04/14/19 05:23


Patient is feeling good and she wants to go home.





Departure





- Departure


Time of Disposition: 05:23


Disposition: Home, Self-Care 01


Condition: Good


Clinical Impression: 


 Atrial fibrillation with rapid ventricular response





Referrals: 


Ryne Arvizu MD [Primary Care Provider] - 


Forms:  ED Department Discharge


Additional Instructions: 


Continue with the medications as prescribed by your family doctor, follow-up 

with your family doctor this coming week or as scheduled, when you see him ask 

him about having some diltiazem at home to take when you feel the atrial 

fibrillation, remember however that when you take the diltiazem it does drop 

your blood pressure systolic down to the 90s and you need to be lying down and 

stay lying down until your blood pressure comes back up or you convert from 

atrial fibrillation to a normal rhythm, if you do not convert with 1 hour then 

you will need to return to the ER





- My Orders


Last 24 Hours: 


My Active Orders





04/14/19 04:00


Diltiazem 125 mg   Sodium Chloride 0.9% [Normal Saline] 100 ml IV TITRATE 





04/14/19 04:15


Sodium Chloride 0.9% [Normal Saline] 1,000 ml IV ASDIRECTED 














- Assessment/Plan


Last 24 Hours: 


My Active Orders





04/14/19 04:00


Diltiazem 125 mg   Sodium Chloride 0.9% [Normal Saline] 100 ml IV TITRATE 





04/14/19 04:15


Sodium Chloride 0.9% [Normal Saline] 1,000 ml IV ASDIRECTED

## 2019-06-15 ENCOUNTER — HOSPITAL ENCOUNTER (EMERGENCY)
Dept: HOSPITAL 41 - JD.ED | Age: 84
Discharge: HOME | End: 2019-06-15
Payer: MEDICARE

## 2019-06-15 VITALS — DIASTOLIC BLOOD PRESSURE: 57 MMHG | SYSTOLIC BLOOD PRESSURE: 126 MMHG

## 2019-06-15 DIAGNOSIS — F32.9: ICD-10-CM

## 2019-06-15 DIAGNOSIS — Z79.01: ICD-10-CM

## 2019-06-15 DIAGNOSIS — F41.9: ICD-10-CM

## 2019-06-15 DIAGNOSIS — Z79.82: ICD-10-CM

## 2019-06-15 DIAGNOSIS — E03.9: ICD-10-CM

## 2019-06-15 DIAGNOSIS — H60.322: Primary | ICD-10-CM

## 2019-06-15 DIAGNOSIS — I48.91: ICD-10-CM

## 2019-06-15 DIAGNOSIS — Z79.899: ICD-10-CM

## 2019-06-15 DIAGNOSIS — Z88.5: ICD-10-CM

## 2019-06-15 DIAGNOSIS — Z88.8: ICD-10-CM

## 2019-06-15 DIAGNOSIS — H10.33: ICD-10-CM

## 2019-06-15 NOTE — EDM.PDOC
ED HPI GENERAL MEDICAL PROBLEM





- General


Chief Complaint: ENT Problem


Stated Complaint: EAR BLEEDING


Time Seen by Provider: 06/15/19 07:46


Source of Information: Reports: Patient, Family (spouse)


History Limitations: Reports: No Limitations





- History of Present Illness


INITIAL COMMENTS - FREE TEXT/NARRATIVE: 





83-year-old female presents to the ED with bleeding from her left ear canal. 

She reports that she got up about 0130 hrs. this morning and had to void. She 

appreciated the itchiness of her left ear and use a Q-tip to try and soothe the 

itch. She states she became startled and gave her ear a good jab at that time 

but did not think that she had hurt herself. Upon awakening this morning she 

found a good deal of blood and clot on her pillowcase. Of note the patient is 

on Eliquis 5 mg twice a day for atrial fibrillation. She denies any change in 

her hearing or muffled hearing. Secondly she appreciated that arise worse 

somewhat stuck shut this morning and were crusted over particularly on the 

right side. 


Onset: Today


Onset Date: 06/15/19


Onset Time: 01:30


Duration: Hour(s):


Location: Reports: Face (Left ear bleeding. Crusted eyes this morning.)


Quality: Reports: Ache (Mild ache in her left ear.), Burning (Burning both eyes 

worse on the right side.), Stabbing (Left ear)


Severity: Moderate (Left ear)


Improves with: Reports: None


Worsens with: Reports: None


Context: Reports: Trauma (Q-tip trauma during the night.).  Denies: Activity, 

Exercise, Lifting, Sick Contact


Associated Symptoms: Reports: Shortness of Breath.  Denies: Confusion, Chest 

Pain, Cough, cough w sputum, Diaphoresis, Fever/Chills, Headaches, Loss of 

Appetite, Malaise, Nausea/Vomiting, Rash, Seizure, Syncope (Chronically)


Treatments PTA: Reports: Other (see below) (None.)


  ** Left Ear


Pain Score (Numeric/FACES): 0





- Related Data


 Allergies











Allergy/AdvReac Type Severity Reaction Status Date / Time


 


nitrofurantoin Allergy  Cannot Verified 06/15/19 07:45





   Remember  


 


codeine AdvReac  Disorientat Verified 06/15/19 07:37





   ion  











Home Meds: 


 Home Meds





atorvaSTATin [Lipitor] 10 mg PO DAILY 02/06/18 [History]


L.acidoph,Paracasei, B.lactis [Probiotic] 1 cap PO DAILY 07/13/18 [History]


Levothyroxine [Synthroid] 88 mcg PO DAILY 07/13/18 [History]


Metoprolol Succinate [Toprol XL] 25 mg PO BID 07/13/18 [History]


Ubidecarenone [Co Q-10] 100 mg PO DAILY 07/13/18 [History]


Vit A/Vit C/Vit E/Zinc/Copper [Icaps Areds Formula] 1 cap PO BID 07/13/18 [

History]


DULoxetine [Cymbalta] 1 cap PO BID 11/11/18 [History]


Aspirin 81 mg PO DAILY 01/13/19 [History]


Apixaban [Eliquis] 5 mg PO BID 04/14/19 [History]


Cholecalciferol (Vitamin D3) [Vitamin D] 1,000 unit PO DAILY 04/14/19 [History]


Docusate Sodium [Colace] 100 mg PO DAILY PRN 06/15/19 [History]


oxyCODONE HCl/Acetaminophen [Percocet 5-325 mg Tablet] 1 - 2 each PO Q4H PRN #

20 tablet 06/15/19 [Rx]











Past Medical History


HEENT History: Reports: Macular Degeneration, Other (See Below) (Presents with 

bleeding from left ear canal.)


Cardiovascular History: Reports: Afib (On Eliquis 5 mg twice a day), Aneurysm, 

CAD, High Cholesterol, MI


Other Cardiovascular History: resolved after surgery; end of october of last 

year


Respiratory History: Reports: Other (See Below)


Other Respiratory History: rib fx


Other Gastrointestinal History: during surgery intestine punctured


Genitourinary History: Reports: Urinary Incontinence


OB/GYN History: Reports: Pregnancy


Other OB/GYN History: x3


Musculoskeletal History: Reports: Arthritis, Fracture


Other Musculoskeletal History: hx of Fractured left side rib


Neurological History: Reports: None


Psychiatric History: Reports: Anxiety, Depression


Endocrine/Metabolic History: Reports: Hypothyroidism


Hematologic History: Reports: None


Immunologic History: Reports: None


Oncologic (Cancer) History: Reports: None


Dermatologic History: Reports: None





- Infectious Disease History


Infectious Disease History: Reports: Chicken Pox





- Past Surgical History


Head Surgeries/Procedures: Reports: None


HEENT Surgical History: Reports: Cataract Surgery


Cardiovascular Surgical History: Reports: Coronary Artery Stent, Vascular 

Surgery, Other (See Below)


Other Cardiovascular Surgeries/Procedures: angiogram last week 11/12/18


GI Surgical History: Reports: Cholecystectomy


Female  Surgical History: Reports: Breast Implant, Hysterectomy, Salpingo-

Oophorectomy, Other (See Below)


Neurological Surgical History: Reports: None





Social & Family History





- Family History


Family Medical History: Noncontributory





- Tobacco Use


Smoking Status *Q: Never Smoker





- Caffeine Use


Caffeine Use: Reports: None





- Recreational Drug Use


Recreational Drug Use: No





- Living Situation & Occupation


Living situation: Reports: 


Occupation: Retired





ED ROS ENT





- Review of Systems


Review Of Systems: See Below


Constitutional: Reports: No Symptoms


HEENT: Reports: Ear Discharge (Blood from the left ear canal), Ear Pain, Eye 

Discharge ( particularly noted on her pillowcase this morning. both eyes had 

discharge this morning and were crusted shut worse on the right side as 

compared to the left.)


Respiratory: Reports: Shortness of Breath.  Denies: Wheezing, Pleuritic Chest 

Pain, Cough, Sputum, Hemoptysis, Other


Cardiovascular: Reports: Blood Pressure Problem, Dyspnea on Exertion, 

Lightheadedness (Zeinab usually.).  Denies: Claudication, Edema, Orthopnea (Often 

runs low due to medications)


Endocrine: Reports: Fatigue


GI/Abdominal: Reports: Constipation (Occasional prongs of constipation)


: Reports: Frequency, Urgency


Musculoskeletal: Reports: Back Pain


Skin: Reports: Bruising (Bruises easily due to being on Eliquis)


Neurological: Reports: No Symptoms


Psychiatric: Reports: No Symptoms


Hematologic/Lymphatic: Reports: No Symptoms


Immunologic: Reports: No Symptoms





ED EXAM, ENT





- Physical Exam


Exam: See Below


Exam Limited By: No Limitations


General Appearance: Alert, WD/WN, Mild Distress


Eye Exam: Bilateral Eye: Conjunctival Injection (Both peripheral margins were 

mildly erythematous on the inferior aspects worse on the right as compared to 

the left with slight purulent discharge on the right medial canthus), PERRL


Ears: Hearing Grossly Normal, Normal TMs, Canal Blood (Left side), Other (

Patient is suffered a acute traumatic injury to her left ear canal from Q-tip. 

There is still a fair amount of blood in the floor of the ear canal up against 

the eardrum.).  No: Normal Canal


Mouth/Throat: Normal Inspection, Normal Gums, Normal Lips





Course





- Vital Signs


Last Recorded V/S: 


 Last Vital Signs











Temp  36.4 C   06/15/19 07:39


 


Pulse  56 L  06/15/19 07:39


 


Resp  16   06/15/19 07:39


 


BP  126/57 L  06/15/19 07:39


 


Pulse Ox  98   06/15/19 07:39














- Orders/Labs/Meds


Meds: 


Medications














Discontinued Medications














Generic Name Dose Route Start Last Admin





  Trade Name Yoselin  PRN Reason Stop Dose Admin


 


Gentamicin Sulfate  2.5 ml  06/15/19 07:56  





  Garamycin 0.3% Ophth Soln  EYEBOTH  06/15/19 07:57  





  ONETIME ONE   





     





     





     





     














- Radiology Interpretation


Free Text/Narrative:: 


83-year-old female presents to the ED with blood noted on her pillowcase this 

morning draining from her left ear. She states she woke earlier this morning 

around 0130 hrs. to void. She had itching in her left ear canal and used a Q-

tip to try and soothe this. She states she became startled and she did jam the Q

-tip hard into her left ear at that time. She didn't think that she would hurt 

herself. Surprise to find blood coming out of her left ear this morning when 

she awoke in the pillowcase had quite a bit of blood on an as well. Of note the 

patient is on Eliquis 5 mg twice a day for atrial fibrillation. Second problem 

was she awoke with crusted eyes this morning she clinically has mild bacterial 

conjunctivitis of both eyes right worse than the left. Examination shows that 

she has a contusion with a lot of bleeding in the floor of the left ear canal 

that I can't reach with a silver nitrate stick. The eardrum is intact however. 

Landed Garamycin ophthalmic drops 2 drops to each eye 3 times a day and to use 

2 drops to the left ear 3 times a day for 4 days. Percocet tabs 5/3/25 

milligrams one tablet every 6-8 hours as necessary for pain relief.








Departure





- Departure


Time of Disposition: 07:58


Disposition: Home, Self-Care 01


Condition: Fair


Clinical Impression: 


Otitis externa


Qualifiers:


 Otitis externa type: hemorrhagic Chronicity: acute Laterality: left Qualified 

Code(s): H60.322 - Hemorrhagic otitis externa, left ear





Conjunctivitis


Qualifiers:


 Conjunctivitis type: acute Acute conjunctivitis type: unspecified Laterality: 

bilateral Qualified Code(s): H10.33 - Unspecified acute conjunctivitis, 

bilateral








- Discharge Information


*PRESCRIPTION DRUG MONITORING PROGRAM REVIEWED*: Not Applicable


*COPY OF PRESCRIPTION DRUG MONITORING REPORT IN PATIENT [facility]AD[facility]: Not Applicable


Prescriptions: 


oxyCODONE HCl/Acetaminophen [Percocet 5-325 mg Tablet] 1 - 2 each PO Q4H PRN #

20 tablet


 PRN Reason: pain relief. 


Instructions:  Otitis Externa, Bacterial Conjunctivitis


Referrals: 


Ryne Arvizu MD [Primary Care Provider] - 


Forms:  ED Department Discharge


Additional Instructions: 


Evaluation the emergency room this morning in regards to eyes been stuck shut 

this morning when he awoke. There appears to be an early infection developing 

in the right eye which we call conjunctivitis. Second problem was irritation of 

the left ear canal from a Q-tip earlier this morning. The eardrum is intact but 

there is blood along the floor of the left ear canal which we call otitis 

externa. You're prescribed Garamycin ophthalmic drops to be used 2 drops to 

each eye 3 times daily for 3 days and similarly 2 drops to the left ear 3 times 

daily for the next 4-5 days to prevent infection and clear by infection. Labs 5/

325 mg one or 2 every 6 hours as needed for relief of back pain.

## 2019-09-10 NOTE — EDM.PDOC
ED HPI GENERAL MEDICAL PROBLEM





- General


Chief Complaint: Cardiovascular Problem


Stated Complaint: A-FIB


Time Seen by Provider: 09/10/19 01:28





- History of Present Illness


INITIAL COMMENTS - FREE TEXT/NARRATIVE: 





83-year-old female presents emergency room with palpitations.





Patient has a long history of atrial fibrillation. She is on Eliquis and 

recently she's had her beta blockers decreased. She's been in multiple times 

with this in the past. She denies any chest pain or chest pressure. Patient 

states she really overdid it yesterday getting stuff done around the house and 

shopping for house supplies and equipment. 





- Related Data


 Allergies











Allergy/AdvReac Type Severity Reaction Status Date / Time


 


Sulfa (Sulfonamide Allergy  Rash Verified 09/10/19 01:31





Antibiotics)     











Home Meds: 


 Home Meds





atorvaSTATin [Lipitor] 10 mg PO DAILY 02/06/18 [History]


L.acidoph,Paracasei, B.lactis [Probiotic] 1 cap PO DAILY 07/13/18 [History]


Levothyroxine [Synthroid] 88 mcg PO DAILY 07/13/18 [History]


Metoprolol Succinate [Toprol XL] 25 mg PO BID 07/13/18 [History]


Ubidecarenone [Co Q-10] 100 mg PO DAILY 07/13/18 [History]


Vit A/Vit C/Vit E/Zinc/Copper [Icaps Areds Formula] 1 cap PO BID 07/13/18 [

History]


DULoxetine [Cymbalta] 1 cap PO BID 11/11/18 [History]


Aspirin 81 mg PO DAILY 01/13/19 [History]


Apixaban [Eliquis] 5 mg PO BID 04/14/19 [History]


Mirabegron [Myrbetriq] 25 mg PO DAILY 09/10/19 [History]











Past Medical History


HEENT History: Reports: Macular Degeneration, Other (See Below)


Cardiovascular History: Reports: Afib, Aneurysm, CAD, High Cholesterol, MI


Other Cardiovascular History: resolved after surgery; end of october of last 

year


Respiratory History: Reports: Other (See Below)


Other Respiratory History: rib fx


Other Gastrointestinal History: during surgery intestine punctured


Genitourinary History: Reports: Urinary Incontinence


OB/GYN History: Reports: Pregnancy


Other OB/GYN History: x3


Musculoskeletal History: Reports: Arthritis, Fracture


Other Musculoskeletal History: hx of Fractured left side rib


Neurological History: Reports: None


Psychiatric History: Reports: Anxiety, Depression


Endocrine/Metabolic History: Reports: Hypothyroidism


Hematologic History: Reports: None


Immunologic History: Reports: None


Oncologic (Cancer) History: Reports: None


Dermatologic History: Reports: None





- Infectious Disease History


Infectious Disease History: Reports: Chicken Pox





- Past Surgical History


Head Surgeries/Procedures: Reports: None


HEENT Surgical History: Reports: Cataract Surgery


Cardiovascular Surgical History: Reports: Coronary Artery Stent, Vascular 

Surgery, Other (See Below)


Other Cardiovascular Surgeries/Procedures: angiogram last week 11/12/18


GI Surgical History: Reports: Cholecystectomy


Female  Surgical History: Reports: Breast Implant, Hysterectomy, Salpingo-

Oophorectomy, Other (See Below)


Neurological Surgical History: Reports: None





Social & Family History





- Family History


Family Medical History: Noncontributory





- Caffeine Use


Caffeine Use: Reports: None





- Living Situation & Occupation


Living situation: Reports: 


Occupation: Retired





ED ROS GENERAL





- Review of Systems


Review Of Systems: See Below


Constitutional: Reports: Fatigue.  Denies: No Symptoms, Fever, Chills


HEENT: Reports: No Symptoms


Respiratory: Reports: No Symptoms


Cardiovascular: Reports: Palpitations.  Denies: Chest Pain


GI/Abdominal: Reports: No Symptoms


: Reports: No Symptoms


Neurological: Reports: No Symptoms


Psychiatric: Reports: No Symptoms


Hematologic/Lymphatic: Reports: No Symptoms


Immunologic: Reports: No Symptoms





ED EXAM, GENERAL





- Physical Exam


Exam: See Below


Exam Limited By: No Limitations


General Appearance: Alert, No Apparent Distress


Head: Atraumatic, Normocephalic


Neck: Normal Inspection, Supple, Non-Tender, Full Range of Motion.  No: 

Lymphadenopathy (L), Lymphadenopathy (R)


Respiratory/Chest: No Respiratory Distress, Lungs Clear, Normal Breath Sounds


Cardiovascular: No Murmur, Tachycardia, Irregularly Irregular


GI/Abdominal: Normal Bowel Sounds, Soft, Non-Tender


Extremities: Pedal Edema (Trace pitting edema this is normal for her)


Neurological: Alert, Oriented, Normal Cognition


Skin Exam: Warm, Dry, Intact





EKG INTERPRETATION


EKG Date: 09/10/19


Rhythm: A-Fib


Rate (Beats/Min): 120


Axis: Normal


QRS: Normal


ST-T: Other (Minimal nondiagnostic ST depression V5 6 inverted T waves in aVL 

this is all stable from November 2018)


QT: Normal


Comparison: No Change


EKG Interpretation Comments: 





A second EKG was done after she converted to sinus with similar QRS morphology 

she has a borderline first-degree AV block most nonspecific ST-T wave changes 

had resolved she still has inverted T's in aVL.





Course





- Vital Signs


Last Recorded V/S: 


 Last Vital Signs











Temp  36.8 C   09/10/19 01:27


 


Pulse  126 H  09/10/19 02:06


 


Resp  23 H  09/10/19 01:27


 


BP  108/68   09/10/19 02:06


 


Pulse Ox  92 L  09/10/19 01:27














- Orders/Labs/Meds


Orders: 


 Active Orders 24 hr











 Category Date Time Status


 


 EKG Documentation Completion [RC] ASDIRECTED Care  09/10/19 01:33 Active


 


 Diltiazem 125 mg Med  09/10/19 03:45 Active





 Sodium Chloride 0.9% [Normal Saline] 100 ml   





 IV TITRATE   


 


 Lactated Ringers [Ringers, Lactated] 1,000 ml Med  09/10/19 01:45 Active





 IV ASDIRECTED   








 Medication Orders





Lactated Ringer's (Ringers, Lactated)  1,000 mls @ 150 mls/hr IV ASDIRECTED ROSHNI


   Last Infusion: 09/10/19 02:49  Dose: 250 mls/hr


   Admin: 09/10/19 01:44  Dose: 150 mls/hr


Diltiazem HCl 125 mg/ Sodium (Chloride)  125 mls @ 5 mls/hr IV TITRATE ROSHNI; 

Protocol


   Last Admin: 09/10/19 03:48  Dose: 5 mg/hr, 5 mls/hr








Labs: 


 Laboratory Tests











  09/10/19 09/10/19 09/10/19 Range/Units





  01:38 01:38 01:38 


 


WBC  9.89    (3.98-10.04)  K/mm3


 


RBC  4.37    (3.98-5.22)  M/mm3


 


Hgb  13.6    (11.2-15.7)  gm/L


 


Hct  40.1    (34.1-44.9)  %


 


MCV  91.8    (79.4-94.8)  fl


 


MCH  31.1    (25.6-32.2)  pg


 


MCHC  33.9    (32.2-35.5)  g/dl


 


RDW Std Deviation  45.3    (36.4-46.3)  fL


 


Plt Count  248    (182-369)  K/mm3


 


MPV  9.9    (9.4-12.3)  fl


 


Neutrophils % (Manual)  66 H    (40-60)  %


 


Band Neutrophils %  0    (0-10)  %


 


Lymphocytes % (Manual)  25    (20-40)  %


 


Atypical Lymphs %  0    %


 


Monocytes % (Manual)  9    (2-10)  %


 


Eosinophils % (Manual)  0 L    (0.7-5.8)  %


 


Basophils % (Manual)  0 L    (0.1-1.2)  


 


Platelet Estimate  Adequate    


 


RBC Morph Comment  Normal    


 


Sodium   144   (136-145)  mEq/L


 


Potassium   3.4 L   (3.5-5.1)  mEq/L


 


Chloride   106   ()  mEq/L


 


Carbon Dioxide   28   (21-32)  mEq/L


 


Anion Gap   13.4   (5-15)  


 


BUN   26 H   (7-18)  mg/dL


 


Creatinine   1.1 H   (0.55-1.02)  mg/dL


 


Est Cr Clr Drug Dosing   34.87   mL/min


 


Estimated GFR (MDRD)   47   (>60)  mL/min


 


BUN/Creatinine Ratio   23.6 H   (14-18)  


 


Glucose   105   ()  mg/dL


 


Calcium   9.0   (8.5-10.1)  mg/dL


 


Magnesium    1.9  (1.8-2.4)  mg/dl


 


Total Bilirubin   1.6 H   (0.2-1.0)  mg/dL


 


AST   16   (15-37)  U/L


 


ALT   16   (14-59)  U/L


 


Alkaline Phosphatase   78   ()  U/L


 


Troponin I   < 0.017   (0.00-0.056)  ng/mL


 


Total Protein   6.8   (6.4-8.2)  g/dl


 


Albumin   3.5   (3.4-5.0)  g/dl


 


Globulin   3.3   gm/dL


 


Albumin/Globulin Ratio   1.1   (1-2)  














  09/10/19 Range/Units





  03:50 


 


WBC   (3.98-10.04)  K/mm3


 


RBC   (3.98-5.22)  M/mm3


 


Hgb   (11.2-15.7)  gm/L


 


Hct   (34.1-44.9)  %


 


MCV   (79.4-94.8)  fl


 


MCH   (25.6-32.2)  pg


 


MCHC   (32.2-35.5)  g/dl


 


RDW Std Deviation   (36.4-46.3)  fL


 


Plt Count   (182-369)  K/mm3


 


MPV   (9.4-12.3)  fl


 


Neutrophils % (Manual)   (40-60)  %


 


Band Neutrophils %   (0-10)  %


 


Lymphocytes % (Manual)   (20-40)  %


 


Atypical Lymphs %   %


 


Monocytes % (Manual)   (2-10)  %


 


Eosinophils % (Manual)   (0.7-5.8)  %


 


Basophils % (Manual)   (0.1-1.2)  


 


Platelet Estimate   


 


RBC Morph Comment   


 


Sodium   (136-145)  mEq/L


 


Potassium   (3.5-5.1)  mEq/L


 


Chloride   ()  mEq/L


 


Carbon Dioxide   (21-32)  mEq/L


 


Anion Gap   (5-15)  


 


BUN   (7-18)  mg/dL


 


Creatinine   (0.55-1.02)  mg/dL


 


Est Cr Clr Drug Dosing   mL/min


 


Estimated GFR (MDRD)   (>60)  mL/min


 


BUN/Creatinine Ratio   (14-18)  


 


Glucose   ()  mg/dL


 


Calcium   (8.5-10.1)  mg/dL


 


Magnesium   (1.8-2.4)  mg/dl


 


Total Bilirubin   (0.2-1.0)  mg/dL


 


AST   (15-37)  U/L


 


ALT   (14-59)  U/L


 


Alkaline Phosphatase   ()  U/L


 


Troponin I  0.039  (0.00-0.056)  ng/mL


 


Total Protein   (6.4-8.2)  g/dl


 


Albumin   (3.4-5.0)  g/dl


 


Globulin   gm/dL


 


Albumin/Globulin Ratio   (1-2)  











Meds: 


Medications











Generic Name Dose Route Start Last Admin





  Trade Name Freq  PRN Reason Stop Dose Admin


 


Lactated Ringer's  1,000 mls @ 150 mls/hr  09/10/19 01:45  09/10/19 02:49





  Ringers, Lactated  IV   250 mls/hr





  ASDIRECTED ROSHNI   Infusion





     





     





     





     


 


Diltiazem HCl 125 mg/ Sodium  125 mls @ 5 mls/hr  09/10/19 03:45  09/10/19 03:48





  Chloride  IV   5 mg/hr





  TITRATE ROSHNI   5 mls/hr





     Administration





     





  Protocol   





  5 MG/HR   














Discontinued Medications














Generic Name Dose Route Start Last Admin





  Trade Name Freq  PRN Reason Stop Dose Admin


 


Diltiazem HCl  10 mg  09/10/19 02:43  09/10/19 02:49





  Cardizem  IVPUSH  09/10/19 02:44  5 mg





  ONETIME ONE   Administration





     





     





     





     


 


Diltiazem HCl  5 mg  09/10/19 03:25  09/10/19 03:34





  Cardizem  IVPUSH  09/10/19 03:26  5 mg





  ONETIME ONE   Administration





     





     





     





     


 


Metoprolol Tartrate  2.5 mg  09/10/19 01:39  09/10/19 01:43





  Lopressor  IVPUSH  09/10/19 01:40  2.5 mg





  ONETIME ONE   Administration





     





     





     





     


 


Metoprolol Tartrate  2.5 mg  09/10/19 01:58  09/10/19 02:06





  Lopressor  IVPUSH  09/10/19 01:59  2.5 mg





  ONETIME ONE   Administration





     





     





     





     


 


Potassium Chloride  40 meq  09/10/19 02:45  09/10/19 02:52





  Klor-Con M20  PO  09/10/19 02:46  40 meq





  ONETIME ONE   Administration





     





     





     





     














- Re-Assessments/Exams


Free Text/Narrative Re-Assessment/Exam: 





09/10/19 03:56


Patient was given some IV metoprolol with minimal improvement in the past she's 

done well on diltiazem week given her couple 5 mg boluses with pretty good 

success in slowing her rate down to around 100 or just above it at this time 

were starting a diltiazem drip 5 mg per hour. A second troponin now will be 

rechecked, initial 1 negative her potassium was low at 3.4 she received 40 mEq 

of oral potassium








09/10/19 04:03


Patient is cardioverted to sinus rate 60 drip shut off


09/10/19 04:53


Second troponin is negative however the first time it was below the detectable 

limits now it is 0.039 I did discuss this with the patient  and offered 

to recheck it but they decided to go home. We will ambulate the patient make 

sure she doesn't have any symptoms from her blood pressure being a little low. 

Anticipate discharge her soon





Departure





- Departure


Time of Disposition: 05:05


Disposition: Home, Self-Care 01


Clinical Impression: 


 Atrial fibrillation with RVR





Referrals: 


Ryne Arvizu MD [Primary Care Provider] - 


Forms:  ED Department Discharge


Additional Instructions: 


Return to the emergency room with any questions problems worsening symptoms. 





Continue your current medications.





Follow-up with Dr. Arvizu in one week if needed





Call your cardiologist so he knows you were here.





- My Orders


Last 24 Hours: 


My Active Orders





09/10/19 01:33


EKG Documentation Completion [RC] ASDIRECTED 





09/10/19 01:45


Lactated Ringers [Ringers, Lactated] 1,000 ml IV ASDIRECTED 





09/10/19 03:45


Diltiazem 125 mg   Sodium Chloride 0.9% [Normal Saline] 100 ml IV TITRATE 














- Assessment/Plan


Last 24 Hours: 


My Active Orders





09/10/19 01:33


EKG Documentation Completion [RC] ASDIRECTED 





09/10/19 01:45


Lactated Ringers [Ringers, Lactated] 1,000 ml IV ASDIRECTED 





09/10/19 03:45


Diltiazem 125 mg   Sodium Chloride 0.9% [Normal Saline] 100 ml IV TITRATE

## 2019-09-16 NOTE — CT
Head CT

 

Technique: Multiple axial sections through the brain were obtained.  

Intravenous contrast was not utilized.

 

Comparison: No prior intracranial imaging is available.

 

Findings: Ventricles along with basal cisterns and sulci over the 

convexities are moderately prominent.  Diminished density is noted 

within the periventricular and subcortical white matter which is 

compatible with small vessel ischemic demyelination change.  Several 

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  

Atherosclerotic calcification is seen within the vertebral vessels and

 carotid siphon.  No evidence of intracranial hemorrhage.  No midline 

shift or mass effect is seen.

 

Bone window settings were reviewed which show the visualized mastoid 

sinuses and paranasal sinuses to appear clear.  No acute calvarial 

abnormality is seen.

 

Impression:

1.  Senescent change as noted above.  No acute intracranial 

abnormality is appreciated on noncontrast head CT exam.

 

Diagnostic code #2

## 2019-09-16 NOTE — EDM.PDOC
ED HPI GENERAL MEDICAL PROBLEM





- General


Chief Complaint: Trauma


Stated Complaint: FALL/FACIAL INJURIES


Time Seen by Provider: 09/16/19 15:13


Source of Information: Reports: Patient, RN Notes Reviewed





- History of Present Illness


INITIAL COMMENTS - FREE TEXT/NARRATIVE: 





83-year-old lady tripped and fell a short time ago. Actually coming here to the 

hospital either to the clinic or ED to have her knee checked from a fall 2 days 

ago.She fell again in the parking lot just a short time ago striking her face 

on the ground with resultant facial contusion, nose laceration. She denies 

headache or loss of consciousness. She has no nausea or vomiting. No neck or 

back pain. She is more concerned about her knee then her face or head. She is 

on eliquis for chronic atrial fib.


  ** Nose


Pain Score (Numeric/FACES): 9





- Related Data


 Allergies











Allergy/AdvReac Type Severity Reaction Status Date / Time


 


nitrofurantoin Allergy  Cannot Verified 09/16/19 15:18





   Remember  


 


Sulfa (Sulfonamide Allergy  Rash Verified 09/16/19 15:14





Antibiotics)     











Home Meds: 


 Home Meds





atorvaSTATin [Lipitor] 10 mg PO DAILY 02/06/18 [History]


L.acidoph,Paracasei, B.lactis [Probiotic] 1 cap PO DAILY 07/13/18 [History]


Levothyroxine [Synthroid] 88 mcg PO DAILY 07/13/18 [History]


Metoprolol Succinate [Toprol XL] 25 mg PO BID 07/13/18 [History]


Ubidecarenone [Co Q-10] 100 mg PO DAILY 07/13/18 [History]


Vit A/Vit C/Vit E/Zinc/Copper [Icaps Areds Formula] 1 cap PO BID 07/13/18 [

History]


DULoxetine [Cymbalta] 1 cap PO BID 11/11/18 [History]


Aspirin 81 mg PO DAILY 01/13/19 [History]


Apixaban [Eliquis] 5 mg PO BID 04/14/19 [History]


Mirabegron [Myrbetriq] 25 mg PO DAILY 09/10/19 [History]


Ca Carbonate/Vitamin D3/Vit K [Citracal Soft Chew] 1 tab PO DAILY 09/16/19 [

History]











Past Medical History


HEENT History: Reports: Macular Degeneration, Other (See Below)


Cardiovascular History: Reports: Afib, Aneurysm, CAD, High Cholesterol, MI


Other Cardiovascular History: resolved after surgery; end of october of last 

year


Respiratory History: Reports: Other (See Below)


Other Respiratory History: rib fx


Other Gastrointestinal History: during surgery intestine punctured


Genitourinary History: Reports: Urinary Incontinence


OB/GYN History: Reports: Pregnancy


Other OB/GYN History: x3


Musculoskeletal History: Reports: Arthritis, Fracture


Other Musculoskeletal History: hx of Fractured left side rib


Neurological History: Reports: None


Psychiatric History: Reports: Anxiety, Depression


Endocrine/Metabolic History: Reports: Hypothyroidism


Hematologic History: Reports: None


Immunologic History: Reports: None


Oncologic (Cancer) History: Reports: None


Dermatologic History: Reports: None





- Infectious Disease History


Infectious Disease History: Reports: Chicken Pox





- Past Surgical History


Head Surgeries/Procedures: Reports: None


HEENT Surgical History: Reports: Cataract Surgery


Cardiovascular Surgical History: Reports: Coronary Artery Stent, Vascular 

Surgery, Other (See Below)


Other Cardiovascular Surgeries/Procedures: angiogram last week 11/12/18


GI Surgical History: Reports: Cholecystectomy


Female  Surgical History: Reports: Breast Implant, Hysterectomy, Salpingo-

Oophorectomy, Other (See Below)


Neurological Surgical History: Reports: None





Social & Family History





- Family History


Family Medical History: Noncontributory





- Tobacco Use


Smoking Status *Q: Never Smoker





- Caffeine Use


Caffeine Use: Reports: None





- Recreational Drug Use


Recreational Drug Use: No





- Living Situation & Occupation


Living situation: Reports: 


Occupation: Retired





Review of Systems





- Review of Systems


Review Of Systems: See Below


Eyes: Reports: No Symptoms


Ears: Reports: No Symptoms


Nose: Reports: Other (small laceration bridge of nose)


Mouth/Throat: Reports: No Symptoms


Respiratory: Denies: Shortness of Breath, Pleuritic Chest Pain


Cardiovascular: Denies: Chest Pain


GI/Abdominal: Denies: Abdominal Pain, Nausea, Vomiting


Musculoskeletal: Reports: Joint Pain (R knee)


Skin: Reports: Bruising (large bruise R knee)


Neurological: Denies: Headache (no Ha at time of initial eval), Numbness, 

Tingling, Weakness





ED EXAM, GENERAL





- Physical Exam


Exam: See Below


General Appearance: Alert, No Apparent Distress


Eye Exam: Bilateral Eye: PERRL


Ears: Normal External Exam


Nose: Other (small but moderately deep lac bridge of nose, nose nontender, no 

visible deformity, no bleeding from nares)


Throat/Mouth: Normal Inspection, Normal Oropharynx


Head: Other (small area of erythema upper forehead, no bony tenderness of face 

or head)


Respiratory/Chest: No Respiratory Distress, Lungs Clear, Normal Breath Sounds


Cardiovascular: Regular Rate, Rhythm


Back Exam: No: Paraspinal Tenderness, Vertebral Tenderness


Extremities: Normal Inspection, Normal Range of Motion, Non-Tender


Neurological: Alert, Oriented, No Motor/Sensory Deficits


Skin Exam: Warm, Dry, Normal Color





ED TRAUMA PROCEDURES





- Laceration/Wound Repair


  ** Anterior Nose


Lac/Wound Length In cm: 0.5


Distal NVT: Neuro & Vascular Intact


Anesthetic Type: Local


Local Anesthesia - Lidocaine (Xylocaine): 1% Plain


Skin Prep: Saline


Suture Size: 4-0


# of Sutures: 2


Suture Type: Nylon





Course





- Vital Signs


Last Recorded V/S: 


 Last Vital Signs











Temp  99.5 F   09/16/19 15:09


 


Pulse  66   09/16/19 15:09


 


Resp  19   09/16/19 15:09


 


BP  122/63   09/16/19 15:09


 


Pulse Ox  93 L  09/16/19 15:09














- Orders/Labs/Meds


Meds: 


Medications














Discontinued Medications














Generic Name Dose Route Start Last Admin





  Trade Name Yoselin  PRN Reason Stop Dose Admin


 


Acetaminophen  975 mg  09/16/19 17:15  09/16/19 17:33





  Tylenol  PO  09/16/19 17:16  975 mg





  NOW ONE   Administration





     





     





     





     


 


Lidocaine HCl  10 ml  09/16/19 15:25  09/16/19 15:36





  Xylocaine 1%  INJECT  09/16/19 15:26  10 ml





  ONETIME ONE   Administration





     





     





     





     














- Re-Assessments/Exams


Free Text/Narrative Re-Assessment/Exam: 





09/16/19 17:19


X-rays of knee are negative for fracture. She does have a nasty hematoma 

inferior aspect of knee. Plan to put a few sutures to repair nasal laceration. 

she now has mild Ha,  Alert, answering questions appropriately. No neurologic 

deficit. Will check head CT due to being on eliquis.  She must have fallen hard 

with consideration of major hematom R just below R knee








09/16/19 19:05. CT of head looks good, still waiting radiology report but with 

CT having normal appearance, no blood plan to let her go home at this time.














Departure





- Departure


Time of Disposition: 18:03


Disposition: Home, Self-Care 01


Condition: Fair


Clinical Impression: 


Fall


Qualifiers:


 Encounter type: initial encounter Qualified Code(s): W19.XXXA - Unspecified 

fall, initial encounter





Forehead contusion


Qualifiers:


 Encounter type: initial encounter Qualified Code(s): S00.83XA - Contusion of 

other part of head, initial encounter





Knee contusion


Qualifiers:


 Encounter type: initial encounter Laterality: right Qualified Code(s): 

S80.01XA - Contusion of right knee, initial encounter





Nasal laceration


Qualifiers:


 Encounter type: initial encounter Qualified Code(s): S01.21XA - Laceration 

without foreign body of nose, initial encounter








- Discharge Information


Instructions:  Contusion, Easy-to-Read


Referrals: 


Ryne Arvizu MD [Primary Care Provider] - 


Forms:  ED Department Discharge


Additional Instructions: 


Ace wrap right knee, use your walker for now to help you with your walking and 

with your balance so you do not have another fall., Ice packs and elevation for 

swelling and hematoma right knee.  Tylenol every 6-8 hours if needed for pain. 

See Dr. Arvizu at clinic in 2-3 days for recheck, call tomorrow morning for 

appointment, return to ED as needed if symptoms worsening in any way.

## 2019-09-17 NOTE — CR
Right knee:  Four views of the right knee were obtained.

 

Comparison: No prior right knee exam.

 

Mild to moderate medial joint space narrowing is seen.  Slight 

osteophytes are noted off the medial joint.  Lateral joint space is 

preserved.  Small osteophyte is noted off the patella.  Osteopenia is 

noted.  No joint effusion is seen.  Mild vascular calcification is 

noted.

 

Impression:

1.  Degenerative change and other findings as noted above.

2.  Nothing acute is appreciated on right knee exam.

 

Diagnostic code #2